# Patient Record
Sex: FEMALE | Race: WHITE | NOT HISPANIC OR LATINO | URBAN - METROPOLITAN AREA
[De-identification: names, ages, dates, MRNs, and addresses within clinical notes are randomized per-mention and may not be internally consistent; named-entity substitution may affect disease eponyms.]

---

## 2024-07-31 ENCOUNTER — LAB REQUISITION (OUTPATIENT)
Dept: LAB | Facility: HOSPITAL | Age: 24
End: 2024-07-31

## 2024-07-31 DIAGNOSIS — Z34.01 ENCOUNTER FOR SUPERVISION OF NORMAL FIRST PREGNANCY, FIRST TRIMESTER (HHS-HCC): ICD-10-CM

## 2024-07-31 PROCEDURE — 87591 N.GONORRHOEAE DNA AMP PROB: CPT

## 2024-07-31 PROCEDURE — 87491 CHLMYD TRACH DNA AMP PROBE: CPT

## 2024-08-01 LAB
C TRACH RRNA SPEC QL NAA+PROBE: NEGATIVE
N GONORRHOEA DNA SPEC QL PROBE+SIG AMP: NEGATIVE

## 2024-08-29 ENCOUNTER — LAB (OUTPATIENT)
Dept: LAB | Facility: LAB | Age: 24
End: 2024-08-29
Payer: COMMERCIAL

## 2024-08-29 DIAGNOSIS — Z34.01 ENCOUNTER FOR SUPERVISION OF NORMAL FIRST PREGNANCY, FIRST TRIMESTER (HHS-HCC): Primary | ICD-10-CM

## 2024-08-29 DIAGNOSIS — R51.9 HEADACHE, UNSPECIFIED: ICD-10-CM

## 2024-08-29 DIAGNOSIS — R03.0 ELEVATED BLOOD-PRESSURE READING, WITHOUT DIAGNOSIS OF HYPERTENSION: ICD-10-CM

## 2024-08-29 LAB
ABO GROUP (TYPE) IN BLOOD: NORMAL
ANTIBODY SCREEN: NORMAL
CREAT UR-MCNC: 49.4 MG/DL (ref 20–320)
ERYTHROCYTE [DISTWIDTH] IN BLOOD BY AUTOMATED COUNT: 13.7 % (ref 11.5–14.5)
HCT VFR BLD AUTO: 36 % (ref 36–46)
HGB BLD-MCNC: 11.9 G/DL (ref 12–16)
MCH RBC QN AUTO: 28.3 PG (ref 26–34)
MCHC RBC AUTO-ENTMCNC: 33.1 G/DL (ref 32–36)
MCV RBC AUTO: 86 FL (ref 80–100)
NRBC BLD-RTO: 0 /100 WBCS (ref 0–0)
PLATELET # BLD AUTO: 212 X10*3/UL (ref 150–450)
PROT UR-ACNC: 4 MG/DL (ref 5–24)
PROT/CREAT UR: 0.08 MG/MG CREAT (ref 0–0.17)
RBC # BLD AUTO: 4.21 X10*6/UL (ref 4–5.2)
RH FACTOR (ANTIGEN D): NORMAL
WBC # BLD AUTO: 7.1 X10*3/UL (ref 4.4–11.3)

## 2024-08-29 PROCEDURE — 87389 HIV-1 AG W/HIV-1&-2 AB AG IA: CPT

## 2024-08-29 PROCEDURE — 82565 ASSAY OF CREATININE: CPT

## 2024-08-29 PROCEDURE — 86803 HEPATITIS C AB TEST: CPT

## 2024-08-29 PROCEDURE — 83615 LACTATE (LD) (LDH) ENZYME: CPT

## 2024-08-29 PROCEDURE — 85027 COMPLETE CBC AUTOMATED: CPT

## 2024-08-29 PROCEDURE — 86850 RBC ANTIBODY SCREEN: CPT

## 2024-08-29 PROCEDURE — 87086 URINE CULTURE/COLONY COUNT: CPT

## 2024-08-29 PROCEDURE — 87340 HEPATITIS B SURFACE AG IA: CPT

## 2024-08-29 PROCEDURE — 86901 BLOOD TYPING SEROLOGIC RH(D): CPT

## 2024-08-29 PROCEDURE — 83036 HEMOGLOBIN GLYCOSYLATED A1C: CPT

## 2024-08-29 PROCEDURE — 86780 TREPONEMA PALLIDUM: CPT

## 2024-08-29 PROCEDURE — 86317 IMMUNOASSAY INFECTIOUS AGENT: CPT

## 2024-08-29 PROCEDURE — 36415 COLL VENOUS BLD VENIPUNCTURE: CPT

## 2024-08-29 PROCEDURE — 86900 BLOOD TYPING SEROLOGIC ABO: CPT

## 2024-08-29 PROCEDURE — 84156 ASSAY OF PROTEIN URINE: CPT

## 2024-08-29 PROCEDURE — 84460 ALANINE AMINO (ALT) (SGPT): CPT

## 2024-08-29 PROCEDURE — 82570 ASSAY OF URINE CREATININE: CPT

## 2024-08-29 PROCEDURE — 84520 ASSAY OF UREA NITROGEN: CPT

## 2024-08-29 PROCEDURE — 84450 TRANSFERASE (AST) (SGOT): CPT

## 2024-08-29 PROCEDURE — 84550 ASSAY OF BLOOD/URIC ACID: CPT

## 2024-08-30 LAB
ALT SERPL W P-5'-P-CCNC: 10 U/L (ref 7–45)
AST SERPL W P-5'-P-CCNC: 14 U/L (ref 9–39)
BUN SERPL-MCNC: 9 MG/DL (ref 6–23)
CREAT SERPL-MCNC: 0.65 MG/DL (ref 0.5–1.05)
EGFRCR SERPLBLD CKD-EPI 2021: >90 ML/MIN/1.73M*2
EST. AVERAGE GLUCOSE BLD GHB EST-MCNC: 82 MG/DL
HBA1C MFR BLD: 4.5 %
HBV SURFACE AG SERPL QL IA: NONREACTIVE
HCV AB SER QL: NONREACTIVE
HIV 1+2 AB+HIV1 P24 AG SERPL QL IA: NONREACTIVE
LDH SERPL L TO P-CCNC: 120 U/L (ref 84–246)
RUBV IGG SERPL IA-ACNC: 2.6 IA
RUBV IGG SERPL QL IA: POSITIVE
TREPONEMA PALLIDUM IGG+IGM AB [PRESENCE] IN SERUM OR PLASMA BY IMMUNOASSAY: NONREACTIVE
URATE SERPL-MCNC: 5.1 MG/DL (ref 2.3–6.7)

## 2024-08-31 LAB — BACTERIA UR CULT: NORMAL

## 2024-09-05 LAB
COMMENTS - MP RESULT TYPE: NORMAL
SCAN RESULT: NORMAL

## 2024-11-06 DIAGNOSIS — Z34.02 ENCOUNTER FOR SUPERVISION OF NORMAL FIRST PREGNANCY, SECOND TRIMESTER: Primary | ICD-10-CM

## 2024-11-13 ENCOUNTER — HOSPITAL ENCOUNTER (OUTPATIENT)
Dept: RADIOLOGY | Facility: CLINIC | Age: 24
Discharge: HOME | End: 2024-11-13
Payer: COMMERCIAL

## 2024-11-13 DIAGNOSIS — Z36.89 SCREENING, ANTENATAL, FOR FETAL ANATOMIC SURVEY (HHS-HCC): ICD-10-CM

## 2024-11-13 PROCEDURE — 76811 OB US DETAILED SNGL FETUS: CPT

## 2024-11-13 PROCEDURE — 76820 UMBILICAL ARTERY ECHO: CPT | Performed by: OBSTETRICS & GYNECOLOGY

## 2024-11-13 PROCEDURE — 76811 OB US DETAILED SNGL FETUS: CPT | Performed by: OBSTETRICS & GYNECOLOGY

## 2024-11-14 ENCOUNTER — TELEPHONE (OUTPATIENT)
Dept: OBSTETRICS AND GYNECOLOGY | Facility: HOSPITAL | Age: 24
End: 2024-11-14
Payer: COMMERCIAL

## 2024-11-21 ENCOUNTER — HOSPITAL ENCOUNTER (OUTPATIENT)
Dept: RADIOLOGY | Facility: HOSPITAL | Age: 24
Discharge: HOME | End: 2024-11-21
Payer: COMMERCIAL

## 2024-11-21 ENCOUNTER — CLINICAL SUPPORT (OUTPATIENT)
Dept: GENETICS | Facility: HOSPITAL | Age: 24
End: 2024-11-21
Payer: COMMERCIAL

## 2024-11-21 ENCOUNTER — INITIAL PRENATAL (OUTPATIENT)
Dept: MATERNAL FETAL MEDICINE | Facility: HOSPITAL | Age: 24
End: 2024-11-21
Payer: COMMERCIAL

## 2024-11-21 VITALS — WEIGHT: 231 LBS | SYSTOLIC BLOOD PRESSURE: 143 MMHG | DIASTOLIC BLOOD PRESSURE: 85 MMHG

## 2024-11-21 VITALS — WEIGHT: 231 LBS | DIASTOLIC BLOOD PRESSURE: 85 MMHG | SYSTOLIC BLOOD PRESSURE: 143 MMHG

## 2024-11-21 DIAGNOSIS — O28.3 ABNORMAL FETAL ULTRASOUND: Primary | ICD-10-CM

## 2024-11-21 DIAGNOSIS — Z36.89 SCREENING, ANTENATAL, FOR FETAL ANATOMIC SURVEY (HHS-HCC): ICD-10-CM

## 2024-11-21 DIAGNOSIS — Z3A.25 25 WEEKS GESTATION OF PREGNANCY (HHS-HCC): ICD-10-CM

## 2024-11-21 DIAGNOSIS — O28.3 ABNORMAL FETAL ULTRASOUND: ICD-10-CM

## 2024-11-21 PROCEDURE — 96040 PR MEDICAL GENETICS COUNSELING EACH 30 MINUTES: CPT | Performed by: GENETIC COUNSELOR, MS

## 2024-11-21 PROCEDURE — 99205 OFFICE O/P NEW HI 60 MIN: CPT | Performed by: OBSTETRICS & GYNECOLOGY

## 2024-11-21 PROCEDURE — 96040 HC GENETIC COUNSELING, EACH 30 MIN: CPT | Performed by: GENETIC COUNSELOR, MS

## 2024-11-21 PROCEDURE — 76816 OB US FOLLOW-UP PER FETUS: CPT

## 2024-11-21 PROCEDURE — 99215 OFFICE O/P EST HI 40 MIN: CPT | Performed by: OBSTETRICS & GYNECOLOGY

## 2024-11-21 ASSESSMENT — PAIN SCALES - GENERAL: PAINLEVEL_OUTOF10: 0-NO PAIN

## 2024-11-21 NOTE — PROGRESS NOTES
Fetal Care Clinic Consultation:  Referring Provider: Dr. Harmon  Date of Service: 24    Summary:   Patient is an 24 y.o.  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    Today's ultrasound findings:   - The CSP measures at the upper limits of normal at 9.6 cm. Septae Lamimae were see and the anterior horns to appear separate from the CSP. The preston mater appears  intact. Placement of the anterior horns is normal suggesting the corpus callosum(CC) is present. The corpus callosum is seen and the PCA and cingulate gyrus seen over  the CC. The CC does look thinned but is seen throughout it's length. Cortical development appears normal for the GA. The posterior fossa appears normal.  -No new issues were identified on this comprehensive survey within limitations of sonographic evaluation at this gestational age. The placenta and adnexa appear within  normal today.  -Today's anatomic survey was completed (distal spine views)  The parents were informed of the above and all questions addressed (see EPIC note). They would like an MRI to evaluate the CNS anatomy in detail. She will follow up in 3  weeks for growth.    History reviewed and patient has the following risk factors:   Unremarkable medical, surgical history    Labs/ Prior Imaging reviewed:   US on  at 24 wga:  -The CSP is not clearly present. The frontal horns appear fused across the midline. The corpus callosum appears present, there is no ventriculomegaly and the remainder of  the neuroanatomy appears normal. The midface appears normal.    Recommendations:   - MRI to be scheduled in 2-3 weeks with US follow up for repeat growth and further evaluation at that time.    Patient counselin) the cavum of septum pellucidum is a fluid-filled midline structure usually easily seen by the 20th week of gestation.  Visualized in the CSP is used as  a proxy to suggest other midline brain structures such as the corpus callosum the optic nerves and the pituitary gland have developed normally as these structures develop together.  The optic nerves and the pituitary gland cannot be seen in utero.  2) the CSP is part of the limbic system connecting to the hypothalamus hippocampus and the amygdala.  The CSP eventually goes away.  It is gone by 4-6 months of age and 85% with normally developing infants.  The limbic system is part of the brain involved in behavioral and emotional responses involving things we need for survival such as regulation of thirst hunger mood and emotional regulation. This is a more active area of the brain in lower order mammals. Absent CSP can also be associated with other forebrain abnormalities including pituitary disorders and septo-optic dysplasia.  The patient was informed that septo-optic dysplasia is associated with variable degrees of visual impairment.  3) MRI would be a better imaging modality for the fetal brain.  Additional information is more often added if the MRI is done later in pregnancy.  After birth,  MRI would yield the best information.  MRI findings are unlikely to change obstetric care for pediatric management. We specifically discussed that MRI would allow us to better visualize the CSP as well as all other brain anatomy. The MRI will be able to better characterize the optic nerves and pituitary regions. 10% of the time, MRI finds has additional pertinent findings. The patient is willing to proceed with MRI to obtain more information.  4) Dependent on the results of the MRI, there may be an increased risk for other genetic problems such as single gene disorders.  This can be discussed after MRI results are obtained. She currently had a rr cfDNA and declines further screening and testing.  We discussed that we are optimistic given the ability to see CSP, although enlarged.  Seen and discussed with   Dustin Choi MD  Fellow, Maternal Fetal Medicine     I saw and evaluated the patient. I personally obtained the key and critical portions of the history and physical exam or was physically present for key and critical portions performed by the fellow. I reviewed the fellow's documentation and discussed the patient with the fellow. I agree with the fellow's medical decision making as documented in the note.    Shaista Chan M.D.  [facility]  Division of Maternal Fetal Medicine  Clinical   Dept. of Reproductive Biology, [facility]  Office phone- [phone]  Nurse coordinator Smiley Hendricks- [phone]

## 2024-11-21 NOTE — PROGRESS NOTES
Thank you for the referral of Ms. Ena Lott. she is a 24 y.o.,   female who was 25w0d weeks pregnant at the time of our appointment with an EDC of 3/6/25.  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    PAST HISTORY:   History related to referral    Ultrasounds in the current pregnancy:   US on 24  Assigned GA24 w + 1 d  Assigned ARMANDO:3/4/2025  Fetal Growth Overview  =================  Exam date        GA              BPD (mm)          HC (mm)               AC (mm)            FL (mm)         HL (mm)           EFW (g)  2024        24w 1d        51.6     <1%        199.5    <1%        87.7     <1%        40    8%        36.2     2%        280    <1%  Impression  =========  A targeted anatomic survey was indicated for absent CSP fused anterior horns.  -Detailed anatomic evaluation of the fetal brain/ventricles, face, heart/outflow tracts and chest anatomy, abdominal organ specific anatomy, number/length/architecture of  limbs and detailed evaluation of the umbilical cord and placenta and other fetal anatomy as clinically indicated was performed.  -The CSP is not clearly present. The frontal horns appear fused across the midline. The corpus callosum appears present, there is no ventriculomegaly and the remainder of  the neuroanatomy appears normal. The midface appears normal.  -Fetal biometry is measuring symmetrically 4 weeks behind the expected ARMANDO with the EFW <1%ile. Wile long bones are measuring globally short, z-scores are in the 1-2  range without evidence of fracturing, bowing and the femur:foot ratio is 1.013 and so this likely representative of overall decreased growth rather than a skeletal dysplasia  - UA dopplers are normal.  -No other malformations were identified on this comprehensive survey within limitations of sonographic evaluation at this gestational age. The placenta and  adnexa appear  within normal today.  -Today's anatomic survey was completed except views of the sagittal L-spine and S-spine     I reviewed the findings today at length. The patient reports low risk cfDNA results for common aneuploidies and sex chromosomes (not available for review). I reviewed the differential including septo-optic dysplasia and lobar holoprosencephaly. I reviewed that often septo-optic dysplasia occurs sporadically in the absence of underling genetic condition, thought the concurrence of early onset FGR raises the suspicion of a genetic diagnosis. I briefly reviewed the implications of these diagnoses, but that additional evaluation is recommended in order to precisely characterize the findings and provide prognostic information.    Follow up ultrasound was performed in Fetal Care Clinic today as follows:    Exam date        GA              BPD (mm)          HC (mm)               AC (mm)              FL (mm)             HL (mm)           EFW (g)  11/13/2024        24w 1d        51.6     <1%        199.5    <1%        87.7     <1%         40         8%        36.2     2%        280    <1%  11/21/2024        25w 0d        56.1     2%          216.8    3%          196.9     22%        43.3    15%        38     2%           668    13%  Impression  =========     BMI 36. The patient was sent for and absent CSP and FGR. However, dating was a bit in error. The patient only had sex once on 6/13/24 with a + pregnancy test 2 week  later. This dating was confirmed by the 8 week scan noted above. Thus the ARMANDO was reassigned to 3/6/25. On the previous evaluation, absent CSP was suspected.  A targeted anatomic survey was indicated for neurosonography  -Fetal biometry is consistent with the stated gestational age, in the low normal range by correction of the ARMANDO  -Detailed anatomic evaluation of the fetal brain/ventricles, face, heart/outflow tracts and chest anatomy, abdominal organ specific anatomy,  number/length/architecture of  limbs and detailed evaluation of the umbilical cord and placenta and other fetal anatomy as clinically indicated was performed.  - The CSP measures at the upper limits of normal at 9.6 cm. Septae Lamimae were see and the anterior horns to appear separate from the CSP. The preston mater appears  intact. Placement of the anterior horns is normal suggesting the corpus callosum(CC) is present. The corpus callosum is seen and the PCA and cingulate gyrus seen over  the CC. The CC does look thinned but is seen throughout it's length. Cortical development appears normal for the GA. The posterior fossa appears normal.  -No new issues were identified on this comprehensive survey within limitations of sonographic evaluation at this gestational age. The placenta and adnexa appear within  normal today.  -Today's anatomic survey was completed (distal spine views)  The parents were informed of the above and all questions addressed (see EPIC note). They would like an MRI to evaluate the CNS anatomy in detail. She will follow up in 3  weeks for growth      Prior aneuploidy screening:        Prior carrier screening: none    Patient otherwise denies complications such as bleeding or cramping, exposures, infection/illness, and travel outside of the [place] since finding out she was pregnant.    FAMILY HISTORY  Medical and family histories were reviewed and no undue concerns regarding this pregnancy were apparent:  Patient:   -Mother with breast cancer, negative genetic testing   -sister with hole in heart, no surgery    Partner:   -Mother and sister with possible ovarian cancer, though unknown   -maternal half brother has a daughter with autism    The remainder of the family history was negative for birth defects, intellectual disability, recurrent pregnancy loss, or recognized inherited conditions.  Consanguinity denied.    REPORTED ETHNICITY/RACE:  Patient: [place]  Patient's partner:  (Soujustice) &  [place]    COUNSELING:    The following information was discussed with your patient:    Chromosomes, genes, sporadic conditions, and inheritance patterns were discussed today. Some of the common chromosome conditions associated with the above findings include Trisomy 13, Trisomy 18, and Trisomy 21, and features of these conditions were reviewed today. We also reviewed more rare chromosome changes, such as microdeletions and microduplications. There could additionally be a single gene condition that connects the above findings. Finally, it is possible that there is no identifiable genetic cause.   We discussed that the US findings today are different from those that lead the patient to be seen in [facility]. We reviewed how it is possible to get different images and therefore have a different and possibly better understanding of the brain structures seen today. We discussed that the CSP is a fluid filled space inside the brain that is seen only in developing brains and not seen in fully developed brains. We reviewed that an abnormal measurement of this brain structure is above 10mm and the measurement today on their baby was 9.6mm. This is considered a normal brain (CSP measurement WNL).   We further discussed children with dilated CSPs have been found 12% of the time to have learning delay. We discussed there is a range of learning delay and ultimately no imaging study will be able to predict the learning capabilities of a child. The larger percentage of children with a dilated CSP were found NOT to have any neurodevelopmental/learning disability. Again, it was reiterated that the current US does not even show a dilated CSP per the measurement definitions. Further, of the children found to have a dilated CSP, there was no genetic cause identified.   We did review the options for further genetic screenings/testing. We discussed the option of non invasive screening as well as the invasive testing with amniocentesis which  would allow the most comprehensive genetic testing to be performed on this pregnancy. We discussed the utility of comprehensive genetic testing is likely much lower given the US findings today as compared to other possible US findings.   Finally we also discussed the option of obtaining more detailed information about the brain structures of the pregnancy with an MRI. The [facility] team is planning an MRI and the family is interested in this. We reviewed that after obtaining the MRI, this could give us more information and help to better assess the probability of genetic association. We discussed genetic testing can be offered at any time and can even occur after birth.     DISPOSITION:  The patient stated that she understood the above information and elected to proceed with MRI without genetic screening at this time. Time spent in consultation 48 minutes.       Thank you for allowing us to participate in the care of your patient.  Should you or your patient have any questions, please do not hesitate to contact our office at [phone].    Sincerely,  Maria Dolores Woodson MD, PGY 5  Maternal Fetal Medicine     Clover Carr MS, [facility]

## 2024-11-27 ENCOUNTER — TELEPHONE (OUTPATIENT)
Dept: OBSTETRICS AND GYNECOLOGY | Facility: HOSPITAL | Age: 24
End: 2024-11-27
Payer: COMMERCIAL

## 2024-12-04 ENCOUNTER — TELEPHONE (OUTPATIENT)
Dept: OBSTETRICS AND GYNECOLOGY | Facility: HOSPITAL | Age: 24
End: 2024-12-04
Payer: COMMERCIAL

## 2024-12-04 DIAGNOSIS — Q07.9: Primary | ICD-10-CM

## 2024-12-09 ENCOUNTER — LAB (OUTPATIENT)
Dept: LAB | Facility: LAB | Age: 24
End: 2024-12-09
Payer: COMMERCIAL

## 2024-12-09 DIAGNOSIS — Z34.02 ENCOUNTER FOR SUPERVISION OF NORMAL FIRST PREGNANCY, SECOND TRIMESTER: ICD-10-CM

## 2024-12-09 LAB
ERYTHROCYTE [DISTWIDTH] IN BLOOD BY AUTOMATED COUNT: 14.6 % (ref 11.5–14.5)
GLUCOSE 1H P 50 G GLC PO SERPL-MCNC: 124 MG/DL
HCT VFR BLD AUTO: 35.6 % (ref 36–46)
HGB BLD-MCNC: 11.2 G/DL (ref 12–16)
MCH RBC QN AUTO: 27.9 PG (ref 26–34)
MCHC RBC AUTO-ENTMCNC: 31.5 G/DL (ref 32–36)
MCV RBC AUTO: 89 FL (ref 80–100)
NRBC BLD-RTO: 0 /100 WBCS (ref 0–0)
PLATELET # BLD AUTO: 168 X10*3/UL (ref 150–450)
RBC # BLD AUTO: 4.02 X10*6/UL (ref 4–5.2)
WBC # BLD AUTO: 6.1 X10*3/UL (ref 4.4–11.3)

## 2024-12-09 PROCEDURE — 36415 COLL VENOUS BLD VENIPUNCTURE: CPT

## 2024-12-09 PROCEDURE — 85027 COMPLETE CBC AUTOMATED: CPT

## 2024-12-09 PROCEDURE — 82947 ASSAY GLUCOSE BLOOD QUANT: CPT

## 2024-12-10 LAB — REFLEX ADDED, ANEMIA PANEL: NORMAL

## 2024-12-17 ENCOUNTER — DOCUMENTATION (OUTPATIENT)
Dept: RADIOLOGY | Facility: CLINIC | Age: 24
End: 2024-12-17
Payer: COMMERCIAL

## 2024-12-17 ENCOUNTER — HOSPITAL ENCOUNTER (OUTPATIENT)
Dept: RADIOLOGY | Facility: HOSPITAL | Age: 24
Discharge: HOME | End: 2024-12-17
Payer: COMMERCIAL

## 2024-12-17 DIAGNOSIS — Q07.9: ICD-10-CM

## 2024-12-17 DIAGNOSIS — O35.9XX0 KNOWN FETAL ANOMALY, ANTEPARTUM, SINGLE OR UNSPECIFIED FETUS (HHS-HCC): ICD-10-CM

## 2024-12-17 PROCEDURE — 76819 FETAL BIOPHYS PROFIL W/O NST: CPT

## 2024-12-17 PROCEDURE — 76816 OB US FOLLOW-UP PER FETUS: CPT | Performed by: OBSTETRICS & GYNECOLOGY

## 2024-12-17 PROCEDURE — 76820 UMBILICAL ARTERY ECHO: CPT | Performed by: OBSTETRICS & GYNECOLOGY

## 2024-12-17 PROCEDURE — 76816 OB US FOLLOW-UP PER FETUS: CPT

## 2024-12-17 PROCEDURE — 76819 FETAL BIOPHYS PROFIL W/O NST: CPT | Performed by: OBSTETRICS & GYNECOLOGY

## 2024-12-17 PROCEDURE — 74712 MRI FETAL SNGL/1ST GESTATION: CPT

## 2024-12-17 NOTE — PROGRESS NOTES
Telephone note:  The patient was informed of the MRI.  The CSP is slightly enlarged but all other midline structures appear normal.  Ventricles are normal. Cortical development is normal.  There is no suggestion of a CSP cyst.  Plan is head ultrasound after delivery.  She will t-lauri to the [facility] for care.  FGR is seen on today's evaluation.  Weekly scans scheduled.  Shaista Chan M.D.  [facility]  Division of Maternal Fetal Medicine  Clinical   Dept. of Reproductive Biology, [facility]  Office phone- [phone]  Nurse coordinator Smiley Hendricks- [phone]

## 2024-12-26 ENCOUNTER — HOSPITAL ENCOUNTER (OUTPATIENT)
Dept: RADIOLOGY | Facility: HOSPITAL | Age: 24
Discharge: HOME | End: 2024-12-26
Payer: COMMERCIAL

## 2024-12-26 DIAGNOSIS — Z36.89 SCREENING, ANTENATAL, FOR FETAL ANATOMIC SURVEY (HHS-HCC): ICD-10-CM

## 2024-12-26 PROCEDURE — 76820 UMBILICAL ARTERY ECHO: CPT

## 2024-12-26 PROCEDURE — 76820 UMBILICAL ARTERY ECHO: CPT | Performed by: OBSTETRICS & GYNECOLOGY

## 2024-12-26 PROCEDURE — 76815 OB US LIMITED FETUS(S): CPT

## 2024-12-26 PROCEDURE — 76819 FETAL BIOPHYS PROFIL W/O NST: CPT | Performed by: OBSTETRICS & GYNECOLOGY

## 2024-12-26 PROCEDURE — 76815 OB US LIMITED FETUS(S): CPT | Performed by: OBSTETRICS & GYNECOLOGY

## 2024-12-27 ENCOUNTER — TELEPHONE (OUTPATIENT)
Dept: RADIOLOGY | Facility: CLINIC | Age: 24
End: 2024-12-27
Payer: COMMERCIAL

## 2024-12-27 NOTE — TELEPHONE ENCOUNTER
This is [place] patient and she trying to find out what medication can she take she is 30wks pregnant she would like a nurse call back

## 2024-12-27 NOTE — TELEPHONE ENCOUNTER
Patient complains of a dry, heavy cough, on week 2.  She denies shortness of breath, fever.  Advised plain mucinex, humidity from show or humidifier, fluids, cough drops.  Saline nose drops if needed.  Patient will monitor and follow up 1/2/25.

## 2025-01-02 ENCOUNTER — CLINICAL SUPPORT (OUTPATIENT)
Dept: GENETICS | Facility: HOSPITAL | Age: 25
End: 2025-01-02
Payer: COMMERCIAL

## 2025-01-02 ENCOUNTER — HOSPITAL ENCOUNTER (OUTPATIENT)
Dept: RADIOLOGY | Facility: HOSPITAL | Age: 25
Discharge: HOME | End: 2025-01-02
Payer: COMMERCIAL

## 2025-01-02 ENCOUNTER — ROUTINE PRENATAL (OUTPATIENT)
Dept: MATERNAL FETAL MEDICINE | Facility: HOSPITAL | Age: 25
End: 2025-01-02
Payer: COMMERCIAL

## 2025-01-02 VITALS — SYSTOLIC BLOOD PRESSURE: 143 MMHG | DIASTOLIC BLOOD PRESSURE: 86 MMHG | WEIGHT: 233 LBS

## 2025-01-02 VITALS — WEIGHT: 233 LBS | DIASTOLIC BLOOD PRESSURE: 89 MMHG | SYSTOLIC BLOOD PRESSURE: 134 MMHG

## 2025-01-02 DIAGNOSIS — O13.3 GESTATIONAL HYPERTENSION, THIRD TRIMESTER (HHS-HCC): ICD-10-CM

## 2025-01-02 DIAGNOSIS — O36.5990 IUGR (INTRAUTERINE GROWTH RESTRICTION) AFFECTING CARE OF MOTHER, UNSPECIFIED TRIMESTER, NOT APPLICABLE OR UNSPECIFIED FETUS (HHS-HCC): ICD-10-CM

## 2025-01-02 DIAGNOSIS — Z36.89 SCREENING, ANTENATAL, FOR FETAL ANATOMIC SURVEY (HHS-HCC): ICD-10-CM

## 2025-01-02 DIAGNOSIS — O35.00X0 FETAL CNS MALFORMATION IN PREGNANCY, SINGLE GESTATION (HHS-HCC): Primary | ICD-10-CM

## 2025-01-02 DIAGNOSIS — O36.5990 FETAL GROWTH RESTRICTION ANTEPARTUM (HHS-HCC): ICD-10-CM

## 2025-01-02 DIAGNOSIS — O99.210 OBESITY AFFECTING PREGNANCY (HHS-HCC): ICD-10-CM

## 2025-01-02 DIAGNOSIS — R93.89 ABNORMAL FINDING ON ULTRASOUND: ICD-10-CM

## 2025-01-02 DIAGNOSIS — Z3A.31 31 WEEKS GESTATION OF PREGNANCY (HHS-HCC): ICD-10-CM

## 2025-01-02 LAB
ALBUMIN SERPL BCP-MCNC: 4.1 G/DL (ref 3.4–5)
ALP SERPL-CCNC: 76 U/L (ref 33–110)
ALT SERPL W P-5'-P-CCNC: 8 U/L (ref 7–45)
ANION GAP SERPL CALC-SCNC: 14 MMOL/L (ref 10–20)
AST SERPL W P-5'-P-CCNC: 12 U/L (ref 9–39)
BILIRUB SERPL-MCNC: 0.3 MG/DL (ref 0–1.2)
BUN SERPL-MCNC: 9 MG/DL (ref 6–23)
CALCIUM SERPL-MCNC: 9.2 MG/DL (ref 8.6–10.6)
CHLORIDE SERPL-SCNC: 108 MMOL/L (ref 98–107)
CO2 SERPL-SCNC: 21 MMOL/L (ref 21–32)
CREAT SERPL-MCNC: 0.49 MG/DL (ref 0.5–1.05)
CREAT UR-MCNC: 70.9 MG/DL (ref 20–320)
EGFRCR SERPLBLD CKD-EPI 2021: >90 ML/MIN/1.73M*2
ERYTHROCYTE [DISTWIDTH] IN BLOOD BY AUTOMATED COUNT: 14.5 % (ref 11.5–14.5)
GLUCOSE SERPL-MCNC: 71 MG/DL (ref 74–99)
HCT VFR BLD AUTO: 36.4 % (ref 36–46)
HGB BLD-MCNC: 12 G/DL (ref 12–16)
MCH RBC QN AUTO: 28.2 PG (ref 26–34)
MCHC RBC AUTO-ENTMCNC: 33 G/DL (ref 32–36)
MCV RBC AUTO: 85 FL (ref 80–100)
NRBC BLD-RTO: 0 /100 WBCS (ref 0–0)
PLATELET # BLD AUTO: 201 X10*3/UL (ref 150–450)
POTASSIUM SERPL-SCNC: 3.9 MMOL/L (ref 3.5–5.3)
PROT SERPL-MCNC: 6.8 G/DL (ref 6.4–8.2)
PROT UR-ACNC: 11 MG/DL (ref 5–24)
PROT/CREAT UR: 0.16 MG/MG CREAT (ref 0–0.17)
RBC # BLD AUTO: 4.26 X10*6/UL (ref 4–5.2)
SODIUM SERPL-SCNC: 139 MMOL/L (ref 136–145)
WBC # BLD AUTO: 8.2 X10*3/UL (ref 4.4–11.3)

## 2025-01-02 PROCEDURE — 85027 COMPLETE CBC AUTOMATED: CPT | Performed by: GENETIC COUNSELOR, MS

## 2025-01-02 PROCEDURE — 80053 COMPREHEN METABOLIC PANEL: CPT | Performed by: GENETIC COUNSELOR, MS

## 2025-01-02 PROCEDURE — 99213 OFFICE O/P EST LOW 20 MIN: CPT | Performed by: OBSTETRICS & GYNECOLOGY

## 2025-01-02 PROCEDURE — 76815 OB US LIMITED FETUS(S): CPT

## 2025-01-02 PROCEDURE — 76820 UMBILICAL ARTERY ECHO: CPT

## 2025-01-02 PROCEDURE — 36415 COLL VENOUS BLD VENIPUNCTURE: CPT | Performed by: GENETIC COUNSELOR, MS

## 2025-01-02 PROCEDURE — 82570 ASSAY OF URINE CREATININE: CPT

## 2025-01-02 PROCEDURE — 90471 IMMUNIZATION ADMIN: CPT | Performed by: STUDENT IN AN ORGANIZED HEALTH CARE EDUCATION/TRAINING PROGRAM

## 2025-01-02 NOTE — PROGRESS NOTES
Carrier Kaylie is a 24 year old, , female who was 31 weeks pregnant at the time of our appointment with an EDC of 2025.  She was seen for genetic counseling with her partner due to fetus with enlarged CSP and growth restriction.        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 EFW was reported as <1%ile; however dating was reassigned on a follow up scan at 25 weeks gestation with biometry in the low normal range (EFW 13%ile). CSP was seen (noted to be borderline enlarged at 9.6mm). genetic counseling was performed at that time, and patient declined additional testing (previously had negative cfDNA screeing via [employer] screen for common aneuploidies and microdeletions. Follow up ultrasound and MRI performed at 28 5/7 weeks gestation at which time CSP measured 10.1mm; no other brain abnormalities noted, and EFW noted to be 8%ile.     FAMILY HISTORY  Medical and family histories were previously obtained as follows:   Patient:   -Mother with breast cancer, negative genetic testing   -sister with hole in heart, no surgery     Partner:   -Mother and sister with possible ovarian cancer, though unknown   -maternal half brother has a daughter with autism     The remainder of the family history was negative for birth defects, intellectual disability, recurrent pregnancy loss, or recognized inherited conditions.  Consanguinity denied.     REPORTED ETHNICITY/RACE:  Patient: [place]  Patient's partner:  (Soujustice) & ItalianD     COUNSELING:    The following information was discussed with your patient:    1. Enlargement of the cavum septum pellucidum has been associated with other congenital anomalies and is also reported to be associated with chromosome abnormalities (particularly trisomy 21) when seen in conjunction with other fetal abnormalities. When isolated, enlargement of  the CSP detected prenatally is of unclear significance, and most often is thought to be a normal, benign variant. It can be associated with dilated third ventricle, vein of Magan malformation, and interhemispheric cysts.  None of these findings have been noted on previous ultrasounds or MRI.  persistence/enlargement of the CSP has been associated with developmental delays and psychiatric disorders such as schizophrenia; however it is unclear whether prenatal enlargement has any association with  persistence/enlargement.     2. Fetal growth restriction (FGR) may be due to incorrect dating, placental abnormalities/insufficiency, drug and alcohol exposure, congenital infection (<5% of cases), or a genetic condition (5-20% of cases). Multiple genetic conditions may result in FGR including single gene conditions as well as chromosome abnormalities.     3. Additional risk factors for FGR include maternal factors such as age (< 16 years or > 35 years), race, lower socioeconomic status, low maternal weight at birth, low pre-pregnancy weight and poor weight gain (BMI less than 20), chronic maternal hypoxia, short or long interpregnancy interval (less than 6 months or 120 months or more), conception with assisted reproductive technologies, maternal medical conditions (chronic hypertension, preeclampsia, pre-gestational diabetes, chronic renal insufficiency, systemic lupus erythematosus (SLE), antiphospholipid syndrome (APS)), malnutrition and gastrointestinal conditions (Crohn's disease, ulcerative colitis, chronic pancreatitis, gastric bypass surgery), and hematologic and immunologic disorders.     4. We discussed that additional genetic non invasive cfDNA screening is available ([employer] screen, [facility] single gene screening) although yield of this testing is uncertain in this case. This testing is not comprehensive and not diagnostic, although can screen for some additional disorders that were not  previously screened for that may be associated with fetal growth restriction. Patient has already had negative cfDNA screening for Down syndrome and other common aneuploidies, and these results were again reviewed.      5. The most comprehensive prenatal genetic testing that would yield the most information is available via amniocentesis. The availability of diagnostic fetal chromosome analysis, single gene panel testing, as well as whole genome sequencing via amniocentesis. The methods, benefits, limitations and risks of amniocentesis and a 1 in 400 risk of complications, including a 1 in 800 risk of  delivery.  genetic testing is also available if concern for a genetic etiology is suspected after delivery.        DISPOSITION:  The patient stated that she understood the above information and declined to proceed with any additional genetic testing at this time. If additional concerns are raised on future ultrasounds she may reconsider testing.       Clover Carr MS, Licensed Genetic Counselor spent 28 minutes with the patient with greater than 50% of the time spent in face to face counseling.     Thank you for allowing us to participate in the care of your patient.  Should you or your patient have any questions, please do not hesitate to contact our office at [phone].      Sincerely,      Clover Carr MS  Licensed Genetic Counselor

## 2025-01-02 NOTE — PROGRESS NOTES
Fetal Care Clinic Follow Up  Date of Service: 25     Summary:   24 year old  at 31w0d presenting for [facility] follow up visit.  - Dilated CSP, no other intracranial abnormalities on ultrasound  - S/p fetal MRI, confirmed dilated CSP, no cyst or midline abnormalities, no additional intracranial abnormalities  - FGR diagnosed at 28 weeks, 8%, currently undergoing weekly  testing  - Following with [facility] for prenatal care    Patient reports she is feeling okay today. Denies vaginal bleeding, leaking fluid, or contractions. Feeling active fetal movement. Denies headache, changes in vision, or shortness of breath, denies changes in swelling or right upper quadrant or epigastric pain. Cough over the last several weeks worst at night and productive of clear sputum, occasional sick contacts but none currently. Symptoms consistent with likely postnasal drip, has not tried medications.    Today's ultrasound findings:   Unusually wide CSP on ultrasound confirmed with MRI and MRI showing no midline abnormalities or cyst.  FGR noted on the previous evaluation. BMI 36, rr cfDNA, normal diabetic screening. She presents for BPP/doppler evaluation. Mildly elevated BP on today's evaluation and the previous one.     - Breech lie  - normal AFV  - BPP /  - CSP again 11mm which is stable but just above the normal range    PEC labs to be sent, 2 x week  testing scheduled,  head ultrasound planned.  PNC here and [place] [facility] [place].  Thank you for allowing us to participate in the care of your patient    History reviewed and patient has the following risk factors:   Elevated blood pressure  - Patient has had mild range BP at initial [facility] consultation at 25 weeks, no elevated BP prior to this, review of baseline all with normotensive values  - Denies preeclampsia symptoms today  - Known FGR, Dopplers not suggestive of placental insufficiency      Labs/ Prior Imaging reviewed:   24 wga:  -The CSP is not  clearly present. The frontal horns appear fused across the midline. The corpus callosum appears present, there is no ventriculomegaly and the remainder of  the neuroanatomy appears normal. The midface appears normal.    25 wga:  - Dating error corrected to ARMANDO 3/6/25  - A targeted anatomic survey was indicated for neurosonography  - Fetal biometry is consistent with the stated gestational age, in the low normal range by correction of the ARMANDO  - Detailed anatomic evaluation of the fetal brain/ventricles, face, heart/outflow tracts and chest anatomy, abdominal organ specific anatomy, number/length/architecture of  limbs and detailed evaluation of the umbilical cord and placenta and other fetal anatomy as clinically indicated was performed.  - The CSP measures at the upper limits of normal at 9.6 cm. Septae Lamimae were see and the anterior horns to appear separate from the CSP. The preston mater appears  intact. Placement of the anterior horns is normal suggesting the corpus callosum(CC) is present. The corpus callosum is seen and the PCA and cingulate gyrus seen over  the CC. The CC does look thinned but is seen throughout it's length. Cortical development appears normal for the GA. The posterior fossa appears normal.  - No new issues were identified on this comprehensive survey within limitations of sonographic evaluation at this gestational age. The placenta and adnexa appear within  normal today.  - Today's anatomic survey was completed (distal spine views)    28 wga:  Fetal MRI  IMPRESSION:  1. The study was limited due to severe fetal motion artifacts.  2. The cavum septum pellucidum measures up to 1.1 cm, which is in the  upper limits/mildly enlarged although both septal leaflets are  visualized. No definite/detectable focal lesion is seen in this  limited evaluation. However, the possibility of tiny CSP cysts can  not be completely excluded and  head ultrasound is advised  and may provide more  information.  3. With the limitations, the remaining of the fetal anatomy is  apparently within normal limits, as detailed above.  Follow Up Ultrasound  - Decreased interval fetal growth. The Ac is at the 31%, and the EFW is at the 8% percentile. Biometry is symmetrically low  - Normal doppler indices with an RI at the 50% percentile  - BPP 8/8, normal amniotic fluid volume  - The area of the CSP was again evaluated today. Measurements are now above upper limit of normal at 1.1cm. What is felt to be the septae laminae are again suspected  to be spaced due to this dilation but otherwise are normal in appearance. It is possible there is a CSP cyst which may mimic the septae laminae. The third and fourth  ventricles are normal in appearance. The anterior horns of the lateral ventricles articulate with the area of the CSP in the expected configuration. There are no other  intracranial anomalies observed today, though imaging is limited by fetal positioning.  - Transvaginal evaluation was not performed given patient will be undergoing fetal MRI  this afternoon. There is normal cortical development for the GA.  - No new issues were identified on this comprehensive survey within limitations of sonographic evaluation at this gestational age. The placenta and adnexa appear within  normal today.    30 wga:  Unusually wide CSP on ultrasound confirmed with MRI and MRI showing no midline abnormalities or cyst. FGR noted on the previous evaluation. BMI 36, rr cfDNA, normal  diabetic screening. She presents for BPP/doppler evaluation.  - Normal amniotic fluid volume  - BPP 8/8  - Normal Doppler indices    Objective:  Vitals:    01/02/25 1219 01/02/25 1436   BP: 143/86 134/89   Weight: 106 kg (233 lb)      Physical Exam:  Gen - no acute distress  Resp - non labored breathing  Cardio - warm and well perfused  Extrem - symmetric, non erythematous  Abd - gravid, non tender  Neuro - alert and oriented, no gross defects    Assessment  and Plan:  24 year old  at 31w0d presenting for [facility] follow up visit, pregnancy complicated by fetal dilated CSP and FGR diagnosed at 28 weeks.    Dilated CSP  - No other intracranial abnormalities identified on MRI, stable measurements today  - Reviewed expected excellent prognosis for this condition in isolation  - Plan for head ultrasound and  evaluation after delivery    FGR  - Weekly BPP and Dopplers ongoing, no findings today suggestive of placental insufficiency    Gestational Hypertension  - New diagnosis today given mild range blood pressures >4 hours apart (separate days) >20 weeks gestation in absence of evidence or diagnosis of chronic hypertension prior to pregnancy  - Normal serum baseline labs and negative baseline proteinuria evalaution  - We discussed the diagnosis of gestational hypertension. Up to 50% of women with gestational hypertension will eventually develop proteinuria or other end-organ dysfunction consistent with the diagnosis of preeclampsia, and this progression is more likely when the hypertension is diagnosed before 32 weeks of gestation  - Symptoms of preeclampsia were discussed today  - Has not been checking home BP but does have cuff, recommended BID BP monitoring, reviewed goal values and BP values for which to present for evaluation  - Plan for twice weekly testing (NST alternating with BPP), to be scheduled  - Weekly prenatal care visits and preeclampsia labs, labs sent today  - Delivery at 37w0d, sooner for other maternal or fetal indications    Prenatal Care  - Following with [facility] for care  - Tdap given today, otherwise up to date  - RSV next visit  - Rh positive  - Postpartum birth control to be discussed next visit    Return visit 1 week for BPP and prenatal visit/BP check    Patient seen and counseled with Dr. Dustin Park MD  [facility] Fellow  I saw and evaluated the patient. I personally obtained the key and critical portions of the history and physical  exam or was physically present for key and critical portions performed by the fellow. I reviewed the fellow's documentation and discussed the patient with the fellow. I agree with the fellow's medical decision making as documented in the note.    Shaista Chan M.D.  [facility]  Division of Maternal Fetal Medicine  Clinical   Dept. of Reproductive Biology, [facility]  Office phone- [phone]  Nurse coordinator Smiley Hendricks- [phone]

## 2025-01-03 ENCOUNTER — TELEPHONE (OUTPATIENT)
Dept: OBSTETRICS AND GYNECOLOGY | Facility: HOSPITAL | Age: 25
End: 2025-01-03
Payer: COMMERCIAL

## 2025-01-03 PROBLEM — O99.210 OBESITY AFFECTING PREGNANCY (HHS-HCC): Status: ACTIVE | Noted: 2025-01-03

## 2025-01-03 PROBLEM — O35.00X0: Status: ACTIVE | Noted: 2025-01-03

## 2025-01-03 PROBLEM — O13.3 GESTATIONAL HYPERTENSION, THIRD TRIMESTER (HHS-HCC): Status: ACTIVE | Noted: 2025-01-03

## 2025-01-03 NOTE — TELEPHONE ENCOUNTER
"RN returned patients call  No answer at this time  Voicemail left encouraging patient to call the office with questions  RN will send \"medications safe in pregnancy\" document to patient via [employer].     Latricia MAURO, RN    "

## 2025-01-06 ENCOUNTER — HOSPITAL ENCOUNTER (OUTPATIENT)
Dept: RADIOLOGY | Facility: HOSPITAL | Age: 25
Discharge: HOME | End: 2025-01-06
Payer: COMMERCIAL

## 2025-01-06 DIAGNOSIS — Z36.89 SCREENING, ANTENATAL, FOR FETAL ANATOMIC SURVEY (HHS-HCC): ICD-10-CM

## 2025-01-06 PROCEDURE — 76816 OB US FOLLOW-UP PER FETUS: CPT

## 2025-01-06 PROCEDURE — 76820 UMBILICAL ARTERY ECHO: CPT

## 2025-01-06 PROCEDURE — 76819 FETAL BIOPHYS PROFIL W/O NST: CPT | Performed by: OBSTETRICS & GYNECOLOGY

## 2025-01-06 PROCEDURE — 76816 OB US FOLLOW-UP PER FETUS: CPT | Performed by: OBSTETRICS & GYNECOLOGY

## 2025-01-06 PROCEDURE — 76820 UMBILICAL ARTERY ECHO: CPT | Performed by: OBSTETRICS & GYNECOLOGY

## 2025-01-07 ENCOUNTER — APPOINTMENT (OUTPATIENT)
Dept: OBSTETRICS AND GYNECOLOGY | Facility: HOSPITAL | Age: 25
End: 2025-01-07
Payer: COMMERCIAL

## 2025-01-09 ENCOUNTER — APPOINTMENT (OUTPATIENT)
Dept: RADIOLOGY | Facility: HOSPITAL | Age: 25
End: 2025-01-09
Payer: COMMERCIAL

## 2025-01-09 ENCOUNTER — APPOINTMENT (OUTPATIENT)
Dept: OBSTETRICS AND GYNECOLOGY | Facility: HOSPITAL | Age: 25
End: 2025-01-09
Payer: COMMERCIAL

## 2025-01-09 ENCOUNTER — ROUTINE PRENATAL (OUTPATIENT)
Dept: MATERNAL FETAL MEDICINE | Facility: HOSPITAL | Age: 25
End: 2025-01-09
Payer: COMMERCIAL

## 2025-01-09 ENCOUNTER — CLINICAL SUPPORT (OUTPATIENT)
Dept: OBSTETRICS AND GYNECOLOGY | Facility: HOSPITAL | Age: 25
End: 2025-01-09
Payer: COMMERCIAL

## 2025-01-09 VITALS — DIASTOLIC BLOOD PRESSURE: 85 MMHG | WEIGHT: 237 LBS | SYSTOLIC BLOOD PRESSURE: 148 MMHG

## 2025-01-09 DIAGNOSIS — O35.00X0 FETAL CNS MALFORMATION IN PREGNANCY, SINGLE GESTATION (HHS-HCC): ICD-10-CM

## 2025-01-09 DIAGNOSIS — Z3A.32 32 WEEKS GESTATION OF PREGNANCY (HHS-HCC): Primary | ICD-10-CM

## 2025-01-09 DIAGNOSIS — O13.3 GESTATIONAL HYPERTENSION, THIRD TRIMESTER (HHS-HCC): ICD-10-CM

## 2025-01-09 LAB
ALBUMIN SERPL BCP-MCNC: 3.6 G/DL (ref 3.4–5)
ALP SERPL-CCNC: 76 U/L (ref 33–110)
ALT SERPL W P-5'-P-CCNC: 5 U/L (ref 7–45)
ANION GAP SERPL CALC-SCNC: 14 MMOL/L (ref 10–20)
AST SERPL W P-5'-P-CCNC: 10 U/L (ref 9–39)
BILIRUB SERPL-MCNC: 0.3 MG/DL (ref 0–1.2)
BUN SERPL-MCNC: 11 MG/DL (ref 6–23)
CALCIUM SERPL-MCNC: 9.2 MG/DL (ref 8.6–10.6)
CHLORIDE SERPL-SCNC: 107 MMOL/L (ref 98–107)
CO2 SERPL-SCNC: 22 MMOL/L (ref 21–32)
CREAT SERPL-MCNC: 0.56 MG/DL (ref 0.5–1.05)
CREAT UR-MCNC: 33.5 MG/DL (ref 20–320)
EGFRCR SERPLBLD CKD-EPI 2021: >90 ML/MIN/1.73M*2
ERYTHROCYTE [DISTWIDTH] IN BLOOD BY AUTOMATED COUNT: 14.6 % (ref 11.5–14.5)
GLUCOSE SERPL-MCNC: 110 MG/DL (ref 74–99)
HCT VFR BLD AUTO: 34.9 % (ref 36–46)
HGB BLD-MCNC: 11.3 G/DL (ref 12–16)
MCH RBC QN AUTO: 27.8 PG (ref 26–34)
MCHC RBC AUTO-ENTMCNC: 32.4 G/DL (ref 32–36)
MCV RBC AUTO: 86 FL (ref 80–100)
NRBC BLD-RTO: 0 /100 WBCS (ref 0–0)
PLATELET # BLD AUTO: 191 X10*3/UL (ref 150–450)
POTASSIUM SERPL-SCNC: 3.6 MMOL/L (ref 3.5–5.3)
PROT SERPL-MCNC: 6.2 G/DL (ref 6.4–8.2)
PROT UR-ACNC: 5 MG/DL (ref 5–24)
PROT/CREAT UR: 0.15 MG/MG CREAT (ref 0–0.17)
RBC # BLD AUTO: 4.06 X10*6/UL (ref 4–5.2)
REFLEX ADDED, ANEMIA PANEL: NORMAL
SODIUM SERPL-SCNC: 139 MMOL/L (ref 136–145)
WBC # BLD AUTO: 8.4 X10*3/UL (ref 4.4–11.3)

## 2025-01-09 PROCEDURE — 82570 ASSAY OF URINE CREATININE: CPT

## 2025-01-09 PROCEDURE — 99213 OFFICE O/P EST LOW 20 MIN: CPT | Performed by: OBSTETRICS & GYNECOLOGY

## 2025-01-09 PROCEDURE — 85027 COMPLETE CBC AUTOMATED: CPT

## 2025-01-09 PROCEDURE — 36415 COLL VENOUS BLD VENIPUNCTURE: CPT

## 2025-01-09 PROCEDURE — 80053 COMPREHEN METABOLIC PANEL: CPT

## 2025-01-09 NOTE — PROGRESS NOTES
Maternal Fetal Medicine Follow Up Note  Date of Service: 25     Summary:   24 year old  at 32w0d presenting for [facility] follow up visit.  - Dilated CSP, no other intracranial abnormalities on ultrasound  - S/p fetal MRI, confirmed dilated CSP, no cyst or midline abnormalities, no additional intracranial abnormalities  - FGR diagnosed at 28 weeks, 8%, stable at 8% on , currently undergoing twice weekly  testing  - Diagnosed with gestational hypertension on  with low mild range BP in office over 2 visits >20 weeks  - Following with [facility] for prenatal care    Patient reports she is feeling well today, no complaints. Mild fatigue which has been normal and stable for her in the third trimester. Denies vaginal bleeding, leaking fluid, or contractions. Feeling active fetal movement.     Denies headache, changes in vision, or shortness of breath, denies changes in swelling or right upper quadrant or epigastric pain. Cough improving with conservative measures discussed last week.        Labs/ Prior Imaging reviewed:   24 wga:  -The CSP is not clearly present. The frontal horns appear fused across the midline. The corpus callosum appears present, there is no ventriculomegaly and the remainder of  the neuroanatomy appears normal. The midface appears normal.     25 wga:  - Dating error corrected to ARMANDO 3/6/25  - A targeted anatomic survey was indicated for neurosonography  - Fetal biometry is consistent with the stated gestational age, in the low normal range by correction of the ARMANDO  - Detailed anatomic evaluation of the fetal brain/ventricles, face, heart/outflow tracts and chest anatomy, abdominal organ specific anatomy, number/length/architecture of  limbs and detailed evaluation of the umbilical cord and placenta and other fetal anatomy as clinically indicated was performed.  - The CSP measures at the upper limits of normal at 9.6 cm. Septae Lamimae were see and the anterior horns to appear separate from  the CSP. The preston mater appears  intact. Placement of the anterior horns is normal suggesting the corpus callosum(CC) is present. The corpus callosum is seen and the PCA and cingulate gyrus seen over  the CC. The CC does look thinned but is seen throughout it's length. Cortical development appears normal for the GA. The posterior fossa appears normal.  - No new issues were identified on this comprehensive survey within limitations of sonographic evaluation at this gestational age. The placenta and adnexa appear within  normal today.  - Today's anatomic survey was completed (distal spine views)     28 wga:  Fetal MRI  IMPRESSION:  1. The study was limited due to severe fetal motion artifacts.  2. The cavum septum pellucidum measures up to 1.1 cm, which is in the  upper limits/mildly enlarged although both septal leaflets are  visualized. No definite/detectable focal lesion is seen in this  limited evaluation. However, the possibility of tiny CSP cysts can  not be completely excluded and  head ultrasound is advised  and may provide more information.  3. With the limitations, the remaining of the fetal anatomy is  apparently within normal limits, as detailed above.  Follow Up Ultrasound  - Decreased interval fetal growth. The Ac is at the 31%, and the EFW is at the 8% percentile. Biometry is symmetrically low  - Normal doppler indices with an RI at the 50% percentile  - BPP 8/8, normal amniotic fluid volume  - The area of the CSP was again evaluated today. Measurements are now above upper limit of normal at 1.1cm. What is felt to be the septae laminae are again suspected  to be spaced due to this dilation but otherwise are normal in appearance. It is possible there is a CSP cyst which may mimic the septae laminae. The third and fourth  ventricles are normal in appearance. The anterior horns of the lateral ventricles articulate with the area of the CSP in the expected configuration. There are no  other  intracranial anomalies observed today, though imaging is limited by fetal positioning.  - Transvaginal evaluation was not performed given patient will be undergoing fetal MRI  this afternoon. There is normal cortical development for the GA.  - No new issues were identified on this comprehensive survey within limitations of sonographic evaluation at this gestational age. The placenta and adnexa appear within  normal today.     30 wga:  Unusually wide CSP on ultrasound confirmed with MRI and MRI showing no midline abnormalities or cyst. FGR noted on the previous evaluation. BMI 36, rr cfDNA, normal  diabetic screening. She presents for BPP/doppler evaluation.  - Normal amniotic fluid volume  - BPP 8/8  - Normal Doppler indices    31 wga:  - Breech lie  - normal AFV  - BPP 8/8  - CSP again 11mm which is stable but just above the normal range    Component      Latest Ref Rng 1/2/2025   GLUCOSE      74 - 99 mg/dL 71 (L)    SODIUM      136 - 145 mmol/L 139    POTASSIUM      3.5 - 5.3 mmol/L 3.9    CHLORIDE      98 - 107 mmol/L 108 (H)    Bicarbonate      21 - 32 mmol/L 21    Anion Gap      10 - 20 mmol/L 14    Blood Urea Nitrogen      6 - 23 mg/dL 9    Creatinine      0.50 - 1.05 mg/dL 0.49 (L)    EGFR      >60 mL/min/1.73m*2 >90    Calcium      8.6 - 10.6 mg/dL 9.2    Albumin      3.4 - 5.0 g/dL 4.1    Alkaline Phosphatase      33 - 110 U/L 76    Total Protein      6.4 - 8.2 g/dL 6.8    AST      9 - 39 U/L 12    Bilirubin Total      0.0 - 1.2 mg/dL 0.3    ALT      7 - 45 U/L 8    WBC      4.4 - 11.3 x10*3/uL 8.2    nRBC      0.0 - 0.0 /100 WBCs 0.0    RBC      4.00 - 5.20 x10*6/uL 4.26    HEMOGLOBIN      12.0 - 16.0 g/dL 12.0    HEMATOCRIT      36.0 - 46.0 % 36.4    MCV      80 - 100 fL 85    MCH      26.0 - 34.0 pg 28.2    MCHC      32.0 - 36.0 g/dL 33.0    RED CELL DISTRIBUTION WIDTH      11.5 - 14.5 % 14.5    Platelets      150 - 450 x10*3/uL 201    Total Protein, Urine      5 - 24 mg/dL 11    Creatinine, Urine  Random      20.0 - 320.0 mg/dL 70.9    T. Protein/Creatinine Ratio      0.00 - 0.17 mg/mg Creat 0.16        Objective:  Vitals:    25 1555   BP: 148/85   Weight: 108 kg (237 lb)     Repeat BP: 135/87    Physical Exam:  Gen - no acute distress  Resp - non labored breathing  Cardio - warm and well perfused  Extrem - symmetric, non erythematous  Abd - gravid, non tender  Neuro - alert and oriented, no gross defects    NST: 135 baseline, + 15 x 15 accelerations, - decelerations, moderate variability  TOCO: acontractile     Assessment and Plan:  24 year old  at 32w0d presenting for [facility] follow up visit, pregnancy complicated by fetal dilated CSP and FGR diagnosed at 28 weeks.     Dilated CSP  - No other intracranial abnormalities identified on MRI, stable measurements over recent imaging  - Reviewed expected excellent prognosis for this condition in isolation  - Plan for head ultrasound and  evaluation after delivery     FGR  - Weekly BPP and Dopplers ongoing, alternating with NSTs as below, no findings suggestive of placental insufficiency to date     Gestational Hypertension  - Diagnosed at 31 weeks given mild range blood pressures >4 hours apart (separate days) >20 weeks gestation in absence of evidence or diagnosis of chronic hypertension prior to pregnancy  - Normal serum baseline labs and negative baseline proteinuria evaluation  - BP at home all normotensive, initial BP in office today mild range, taken within 5 minutes of arrival, repeat after NST normotensive  - HELLP labs and urine proteinuria evaluation last week within normal limits, no evidence of end organ damage  - No signs/symptoms of worsening disease today  - NST reactive today, reassuring testing  - Continue twice weekly testing BPP/NST alternating  - Weekly prenatal care visits and preeclampsia labs, labs sent today  - Delivery at 37w0d, sooner for other maternal or fetal indications     Prenatal Care  - Following with [facility] for care  -  S/p tdap, considering RSV, will comm  - Tdap given today  - RSV discussed today, declines today though will consider and let us know next week  - Rh positive  - Postpartum birth control: discussed, currently undecided, will consider     Return visit 1 week for BPP and prenatal visit/BP check     Patient seen and counseled with Dr. Gilda Park MD  MFM Fellow

## 2025-01-13 ENCOUNTER — HOSPITAL ENCOUNTER (OUTPATIENT)
Dept: RADIOLOGY | Facility: HOSPITAL | Age: 25
Discharge: HOME | End: 2025-01-13
Payer: COMMERCIAL

## 2025-01-13 DIAGNOSIS — O36.5930 MATERNAL CARE FOR OTHER KNOWN OR SUSPECTED POOR FETAL GROWTH, THIRD TRIMESTER, NOT APPLICABLE OR UNSPECIFIED: ICD-10-CM

## 2025-01-13 DIAGNOSIS — Z36.89 SCREENING, ANTENATAL, FOR FETAL ANATOMIC SURVEY (HHS-HCC): ICD-10-CM

## 2025-01-13 PROCEDURE — 76815 OB US LIMITED FETUS(S): CPT

## 2025-01-13 PROCEDURE — 76820 UMBILICAL ARTERY ECHO: CPT

## 2025-01-13 PROCEDURE — 76819 FETAL BIOPHYS PROFIL W/O NST: CPT | Performed by: OBSTETRICS & GYNECOLOGY

## 2025-01-13 PROCEDURE — 76820 UMBILICAL ARTERY ECHO: CPT | Performed by: OBSTETRICS & GYNECOLOGY

## 2025-01-13 PROCEDURE — 76815 OB US LIMITED FETUS(S): CPT | Performed by: OBSTETRICS & GYNECOLOGY

## 2025-01-16 ENCOUNTER — LAB REQUISITION (OUTPATIENT)
Dept: LAB | Facility: HOSPITAL | Age: 25
End: 2025-01-16
Payer: COMMERCIAL

## 2025-01-16 ENCOUNTER — PROCEDURE VISIT (OUTPATIENT)
Dept: OBSTETRICS AND GYNECOLOGY | Facility: HOSPITAL | Age: 25
End: 2025-01-16
Payer: COMMERCIAL

## 2025-01-16 ENCOUNTER — ROUTINE PRENATAL (OUTPATIENT)
Dept: MATERNAL FETAL MEDICINE | Facility: HOSPITAL | Age: 25
End: 2025-01-16
Payer: COMMERCIAL

## 2025-01-16 VITALS — SYSTOLIC BLOOD PRESSURE: 144 MMHG | DIASTOLIC BLOOD PRESSURE: 84 MMHG | WEIGHT: 240.2 LBS

## 2025-01-16 DIAGNOSIS — O13.3 GESTATIONAL HYPERTENSION, THIRD TRIMESTER (HHS-HCC): ICD-10-CM

## 2025-01-16 DIAGNOSIS — O35.00X0 FETAL CNS MALFORMATION IN PREGNANCY, SINGLE GESTATION (HHS-HCC): ICD-10-CM

## 2025-01-16 DIAGNOSIS — O13.3 GESTATIONAL (PREGNANCY-INDUCED) HYPERTENSION WITHOUT SIGNIFICANT PROTEINURIA, THIRD TRIMESTER (HHS-HCC): ICD-10-CM

## 2025-01-16 DIAGNOSIS — O13.3 GESTATIONAL HYPERTENSION, THIRD TRIMESTER (HHS-HCC): Primary | ICD-10-CM

## 2025-01-16 DIAGNOSIS — Z3A.33 33 WEEKS GESTATION OF PREGNANCY (HHS-HCC): Primary | ICD-10-CM

## 2025-01-16 LAB
ALBUMIN SERPL BCP-MCNC: 3.6 G/DL (ref 3.4–5)
ALP SERPL-CCNC: 76 U/L (ref 33–110)
ALT SERPL W P-5'-P-CCNC: 7 U/L (ref 7–45)
ANION GAP SERPL CALC-SCNC: 15 MMOL/L (ref 10–20)
AST SERPL W P-5'-P-CCNC: 11 U/L (ref 9–39)
BILIRUB SERPL-MCNC: 0.3 MG/DL (ref 0–1.2)
BUN SERPL-MCNC: 9 MG/DL (ref 6–23)
CALCIUM SERPL-MCNC: 9.6 MG/DL (ref 8.6–10.6)
CHLORIDE SERPL-SCNC: 107 MMOL/L (ref 98–107)
CO2 SERPL-SCNC: 22 MMOL/L (ref 21–32)
CREAT SERPL-MCNC: 0.57 MG/DL (ref 0.5–1.05)
CREAT UR-MCNC: 21.2 MG/DL (ref 20–320)
EGFRCR SERPLBLD CKD-EPI 2021: >90 ML/MIN/1.73M*2
ERYTHROCYTE [DISTWIDTH] IN BLOOD BY AUTOMATED COUNT: 14.5 % (ref 11.5–14.5)
GLUCOSE SERPL-MCNC: 98 MG/DL (ref 74–99)
HCT VFR BLD AUTO: 34.4 % (ref 36–46)
HGB BLD-MCNC: 11.3 G/DL (ref 12–16)
HOLD SPECIMEN: NORMAL
MCH RBC QN AUTO: 28 PG (ref 26–34)
MCHC RBC AUTO-ENTMCNC: 32.8 G/DL (ref 32–36)
MCV RBC AUTO: 85 FL (ref 80–100)
NRBC BLD-RTO: 0 /100 WBCS (ref 0–0)
PLATELET # BLD AUTO: 196 X10*3/UL (ref 150–450)
POTASSIUM SERPL-SCNC: 3.5 MMOL/L (ref 3.5–5.3)
PROT SERPL-MCNC: 6.4 G/DL (ref 6.4–8.2)
PROT UR-ACNC: <4 MG/DL (ref 5–24)
PROT/CREAT UR: ABNORMAL MG/G{CREAT}
RBC # BLD AUTO: 4.03 X10*6/UL (ref 4–5.2)
SODIUM SERPL-SCNC: 140 MMOL/L (ref 136–145)
WBC # BLD AUTO: 7.6 X10*3/UL (ref 4.4–11.3)

## 2025-01-16 PROCEDURE — 99212 OFFICE O/P EST SF 10 MIN: CPT | Performed by: OBSTETRICS & GYNECOLOGY

## 2025-01-16 PROCEDURE — 80053 COMPREHEN METABOLIC PANEL: CPT | Mod: OUT | Performed by: STUDENT IN AN ORGANIZED HEALTH CARE EDUCATION/TRAINING PROGRAM

## 2025-01-16 PROCEDURE — 84156 ASSAY OF PROTEIN URINE: CPT | Mod: OUT | Performed by: STUDENT IN AN ORGANIZED HEALTH CARE EDUCATION/TRAINING PROGRAM

## 2025-01-16 PROCEDURE — 59025 FETAL NON-STRESS TEST: CPT | Performed by: OBSTETRICS & GYNECOLOGY

## 2025-01-16 PROCEDURE — 85027 COMPLETE CBC AUTOMATED: CPT | Mod: OUT | Performed by: STUDENT IN AN ORGANIZED HEALTH CARE EDUCATION/TRAINING PROGRAM

## 2025-01-16 PROCEDURE — 99212 OFFICE O/P EST SF 10 MIN: CPT | Mod: 25 | Performed by: OBSTETRICS & GYNECOLOGY

## 2025-01-16 NOTE — PROGRESS NOTES
Patient here for an NST  [place] and US applied to patient  Continuous fetal monitoring for at least 20 minutes completed  EFM tracing reactive  EFM tracing reviewed and approved by Dr. Chan

## 2025-01-16 NOTE — PROGRESS NOTES
Maternal Fetal Medicine Follow Up Note  Date of Service: 25     Summary:   24 year old  at 33w0d presenting for [facility] follow up visit.    - Dilated CSP, no other intracranial abnormalities on ultrasound  - S/p fetal MRI, confirmed dilated CSP, no cyst or midline abnormalities, no additional intracranial abnormalities  - FGR diagnosed at 28 weeks, 8%, stable at 8% on , currently undergoing twice weekly  testing  - Diagnosed with gestational hypertension on  with low mild range BP in office over 2 visits >20 weeks  - Following with [facility] for prenatal care      Patient reports she is feeling well today, no complaints.  Denies vaginal bleeding, leaking fluid, or contractions. Feeling active fetal movement.     Denies headache, changes in vision, or shortness of breath, denies changes in swelling or right upper quadrant or epigastric pain.  Less coughing.       Labs/ Prior Imaging reviewed:   24 wga:  -The CSP is not clearly present. The frontal horns appear fused across the midline. The corpus callosum appears present, there is no ventriculomegaly and the remainder of  the neuroanatomy appears normal. The midface appears normal.     25 wga:  - Dating error corrected to ARMANDO 3/6/25  - A targeted anatomic survey was indicated for neurosonography  - Fetal biometry is consistent with the stated gestational age, in the low normal range by correction of the ARMANDO  - Detailed anatomic evaluation of the fetal brain/ventricles, face, heart/outflow tracts and chest anatomy, abdominal organ specific anatomy, number/length/architecture of  limbs and detailed evaluation of the umbilical cord and placenta and other fetal anatomy as clinically indicated was performed.  - The CSP measures at the upper limits of normal at 9.6 cm. Septae Lamimae were see and the anterior horns to appear separate from the CSP. The preston mater appears  intact. Placement of the anterior horns is normal suggesting the corpus callosum(CC) is  present. The corpus callosum is seen and the PCA and cingulate gyrus seen over  the CC. The CC does look thinned but is seen throughout it's length. Cortical development appears normal for the GA. The posterior fossa appears normal.  - No new issues were identified on this comprehensive survey within limitations of sonographic evaluation at this gestational age. The placenta and adnexa appear within  normal today.  - Today's anatomic survey was completed (distal spine views)     28 wga:  Fetal MRI  IMPRESSION:  1. The study was limited due to severe fetal motion artifacts.  2. The cavum septum pellucidum measures up to 1.1 cm, which is in the  upper limits/mildly enlarged although both septal leaflets are  visualized. No definite/detectable focal lesion is seen in this  limited evaluation. However, the possibility of tiny CSP cysts can  not be completely excluded and  head ultrasound is advised  and may provide more information.  3. With the limitations, the remaining of the fetal anatomy is  apparently within normal limits, as detailed above.  Follow Up Ultrasound  - Decreased interval fetal growth. The Ac is at the 31%, and the EFW is at the 8% percentile. Biometry is symmetrically low  - Normal doppler indices with an RI at the 50% percentile  - BPP 8/8, normal amniotic fluid volume  - The area of the CSP was again evaluated today. Measurements are now above upper limit of normal at 1.1cm. What is felt to be the septae laminae are again suspected  to be spaced due to this dilation but otherwise are normal in appearance. It is possible there is a CSP cyst which may mimic the septae laminae. The third and fourth  ventricles are normal in appearance. The anterior horns of the lateral ventricles articulate with the area of the CSP in the expected configuration. There are no other  intracranial anomalies observed today, though imaging is limited by fetal positioning.  - Transvaginal evaluation was not  performed given patient will be undergoing fetal MRI  this afternoon. There is normal cortical development for the GA.  - No new issues were identified on this comprehensive survey within limitations of sonographic evaluation at this gestational age. The placenta and adnexa appear within  normal today.     30 wga:  Unusually wide CSP on ultrasound confirmed with MRI and MRI showing no midline abnormalities or cyst. FGR noted on the previous evaluation. BMI 36, rr cfDNA, normal  diabetic screening. She presents for BPP/doppler evaluation.  - Normal amniotic fluid volume  - BPP 8/8  - Normal Doppler indices    31 wga:  - Breech lie  - normal AFV  - BPP 8/8  - CSP again 11mm which is stable but just above the normal range    Component      Latest Ref Rng 1/9/2025   GLUCOSE      74 - 99 mg/dL 110 (H)    SODIUM      136 - 145 mmol/L 139    POTASSIUM      3.5 - 5.3 mmol/L 3.6    CHLORIDE      98 - 107 mmol/L 107    Bicarbonate      21 - 32 mmol/L 22    Anion Gap      10 - 20 mmol/L 14    Blood Urea Nitrogen      6 - 23 mg/dL 11    Creatinine      0.50 - 1.05 mg/dL 0.56    EGFR      >60 mL/min/1.73m*2 >90    Calcium      8.6 - 10.6 mg/dL 9.2    Albumin      3.4 - 5.0 g/dL 3.6    Alkaline Phosphatase      33 - 110 U/L 76    Total Protein      6.4 - 8.2 g/dL 6.2 (L)    AST      9 - 39 U/L 10    Bilirubin Total      0.0 - 1.2 mg/dL 0.3    ALT      7 - 45 U/L 5 (L)    WBC      4.4 - 11.3 x10*3/uL 8.4    nRBC      0.0 - 0.0 /100 WBCs 0.0    RBC      4.00 - 5.20 x10*6/uL 4.06    HEMOGLOBIN      12.0 - 16.0 g/dL 11.3 (L)    HEMATOCRIT      36.0 - 46.0 % 34.9 (L)    MCV      80 - 100 fL 86    MCH      26.0 - 34.0 pg 27.8    MCHC      32.0 - 36.0 g/dL 32.4    RED CELL DISTRIBUTION WIDTH      11.5 - 14.5 % 14.6 (H)    Platelets      150 - 450 x10*3/uL 191    Total Protein, Urine      5 - 24 mg/dL 5    Creatinine, Urine Random      20.0 - 320.0 mg/dL 33.5    T. Protein/Creatinine Ratio      0.00 - 0.17 mg/mg Creat 0.15    Reflex  added, Anemia panel No reflex.       Objective:  Vitals:    25 1503   BP: 144/84   Weight: 109 kg (240 lb 3.2 oz)     Physical Exam:  Gen - no acute distress  Resp - non labored breathing  Cardio - warm and well perfused  Extrem - symmetric, non erythematous  Abd - gravid, non tender  Neuro - alert and oriented, no gross defects    NST: 130 baseline, + 15 x 15 accelerations, - decelerations, moderate variability  TOCO: acontractile     Assessment and Plan:  24 year old  at 33w0d presenting for [facility] follow up visit, pregnancy complicated by fetal dilated CSP and FGR diagnosed at 28 weeks.     Dilated CSP  - No other intracranial abnormalities identified on MRI, stable measurements over recent imaging  - Expected expected excellent prognosis for this condition in isolation  - Plan for head ultrasound and  evaluation after delivery     FGR  - Weekly BPP and Dopplers ongoing, alternating with NSTs as below, no findings suggestive of placental insufficiency to date.       Gestational Hypertension  - Diagnosed at 31 weeks given mild range blood pressures >4 hours apart (separate days) >20 weeks gestation in absence of evidence or diagnosis of chronic hypertension prior to pregnancy  - Normal serum baseline labs and negative baseline proteinuria evaluation  - BP at home all 130/80's  - HELLP labs and urine proteinuria evaluation last week within normal limits, no evidence of end organ damage  - No signs/symptoms of worsening disease today  - NST reactive today, reassuring testing  - Continue twice weekly testing BPP/NST alternating  - Weekly prenatal care visits and preeclampsia labs, labs sent today  - Delivery at 37w0d, sooner for other maternal or fetal indications     Prenatal Care  - Following with [facility] for care  - S/p tdap, RSV declined  - Rh positive  - Postpartum birth control: discussed, currently undecided, will consider     Return visit 1 week for BPP and prenatal visit/BP check  PEC labs sent  today        Shaista Chan M.D.  [facility]  Division of Maternal Fetal Medicine  Clinical   Dept. of Reproductive Biology, [facility]  Office phone- [phone]  Nurse coordinator Smiley Hendricks- [phone]

## 2025-01-20 ENCOUNTER — APPOINTMENT (OUTPATIENT)
Dept: RADIOLOGY | Facility: HOSPITAL | Age: 25
End: 2025-01-20
Payer: COMMERCIAL

## 2025-01-20 ENCOUNTER — HOSPITAL ENCOUNTER (OUTPATIENT)
Dept: RADIOLOGY | Facility: HOSPITAL | Age: 25
Discharge: HOME | End: 2025-01-20
Payer: COMMERCIAL

## 2025-01-20 DIAGNOSIS — Z36.89 SCREENING, ANTENATAL, FOR FETAL ANATOMIC SURVEY (HHS-HCC): ICD-10-CM

## 2025-01-20 PROCEDURE — 76815 OB US LIMITED FETUS(S): CPT

## 2025-01-20 PROCEDURE — 76820 UMBILICAL ARTERY ECHO: CPT | Performed by: STUDENT IN AN ORGANIZED HEALTH CARE EDUCATION/TRAINING PROGRAM

## 2025-01-20 PROCEDURE — 76815 OB US LIMITED FETUS(S): CPT | Performed by: STUDENT IN AN ORGANIZED HEALTH CARE EDUCATION/TRAINING PROGRAM

## 2025-01-20 PROCEDURE — 76820 UMBILICAL ARTERY ECHO: CPT

## 2025-01-20 PROCEDURE — 76819 FETAL BIOPHYS PROFIL W/O NST: CPT | Performed by: STUDENT IN AN ORGANIZED HEALTH CARE EDUCATION/TRAINING PROGRAM

## 2025-01-23 ENCOUNTER — PROCEDURE VISIT (OUTPATIENT)
Dept: OBSTETRICS AND GYNECOLOGY | Facility: HOSPITAL | Age: 25
End: 2025-01-23
Payer: COMMERCIAL

## 2025-01-23 ENCOUNTER — ROUTINE PRENATAL (OUTPATIENT)
Dept: MATERNAL FETAL MEDICINE | Facility: HOSPITAL | Age: 25
End: 2025-01-23
Payer: COMMERCIAL

## 2025-01-23 VITALS — SYSTOLIC BLOOD PRESSURE: 145 MMHG | DIASTOLIC BLOOD PRESSURE: 83 MMHG | WEIGHT: 239 LBS

## 2025-01-23 DIAGNOSIS — O13.3 GESTATIONAL HYPERTENSION, THIRD TRIMESTER (HHS-HCC): ICD-10-CM

## 2025-01-23 DIAGNOSIS — O36.5990 FETAL GROWTH RESTRICTION ANTEPARTUM (HHS-HCC): ICD-10-CM

## 2025-01-23 DIAGNOSIS — Z3A.34 34 WEEKS GESTATION OF PREGNANCY (HHS-HCC): ICD-10-CM

## 2025-01-23 DIAGNOSIS — O35.00X0 FETAL CNS MALFORMATION IN PREGNANCY, SINGLE GESTATION (HHS-HCC): Primary | ICD-10-CM

## 2025-01-23 PROCEDURE — 85027 COMPLETE CBC AUTOMATED: CPT | Mod: OUT | Performed by: STUDENT IN AN ORGANIZED HEALTH CARE EDUCATION/TRAINING PROGRAM

## 2025-01-23 PROCEDURE — 59025 FETAL NON-STRESS TEST: CPT | Performed by: STUDENT IN AN ORGANIZED HEALTH CARE EDUCATION/TRAINING PROGRAM

## 2025-01-23 PROCEDURE — 80053 COMPREHEN METABOLIC PANEL: CPT | Mod: OUT | Performed by: STUDENT IN AN ORGANIZED HEALTH CARE EDUCATION/TRAINING PROGRAM

## 2025-01-23 PROCEDURE — 82570 ASSAY OF URINE CREATININE: CPT | Mod: OUT | Performed by: STUDENT IN AN ORGANIZED HEALTH CARE EDUCATION/TRAINING PROGRAM

## 2025-01-23 PROCEDURE — 99214 OFFICE O/P EST MOD 30 MIN: CPT | Mod: GC | Performed by: STUDENT IN AN ORGANIZED HEALTH CARE EDUCATION/TRAINING PROGRAM

## 2025-01-23 NOTE — PROGRESS NOTES
Alicia Jarquin needs to be seen by her PCP before meds are refilled.   Patient here for an NST  [place] and US applied to patient  Continuous fetal monitoring for at least 20 minutes completed  EFM tracing reactive  EFM tracing reviewed and approved by Dr. Vivian Garcia, RN

## 2025-01-23 NOTE — PROGRESS NOTES
Maternal Fetal Medicine Follow Up Note  Date of Service: 25     Summary:   24 year old  at  presenting for [facility] follow up visit.    - Fetus with dilated CSP, no other intracranial abnormalities on ultrasound  - S/p fetal MRI, confirmed dilated CSP, no cyst or midline abnormalities, no additional intracranial abnormalities  - FGR diagnosed at 28 weeks, 8%, stable at 8% on , currently undergoing twice weekly  testing  - Diagnosed with gestational hypertension on  with low mild range BP in office over 2 visits >20 weeks  - Following with [facility] for prenatal care    Patient feels well today. Denies vaginal bleeding, leaking fluid, or contractions. Reports active fetal movement. Denies headache, changes in vision, or shortness of breath, denies changes in swelling or right upper quadrant or epigastric pain.  Post-nasal drip cough has resolved.       Labs/ Prior Imaging reviewed:   24 wga:  -The CSP is not clearly present. The frontal horns appear fused across the midline. The corpus callosum appears present, there is no ventriculomegaly and the remainder of  the neuroanatomy appears normal. The midface appears normal.     25 wga:  - Dating error corrected to ARMANDO 3/6/25  - A targeted anatomic survey was indicated for neurosonography  - Fetal biometry is consistent with the stated gestational age, in the low normal range by correction of the ARMANDO  - Detailed anatomic evaluation of the fetal brain/ventricles, face, heart/outflow tracts and chest anatomy, abdominal organ specific anatomy, number/length/architecture of  limbs and detailed evaluation of the umbilical cord and placenta and other fetal anatomy as clinically indicated was performed.  - The CSP measures at the upper limits of normal at 9.6 cm. Septae Lamimae were see and the anterior horns to appear separate from the CSP. The preston mater appears  intact. Placement of the anterior horns is normal suggesting the corpus callosum(CC) is present.  The corpus callosum is seen and the PCA and cingulate gyrus seen over  the CC. The CC does look thinned but is seen throughout it's length. Cortical development appears normal for the GA. The posterior fossa appears normal.  - No new issues were identified on this comprehensive survey within limitations of sonographic evaluation at this gestational age. The placenta and adnexa appear within  normal today.  - Today's anatomic survey was completed (distal spine views)     28 wga:  Fetal MRI  IMPRESSION:  1. The study was limited due to severe fetal motion artifacts.  2. The cavum septum pellucidum measures up to 1.1 cm, which is in the  upper limits/mildly enlarged although both septal leaflets are  visualized. No definite/detectable focal lesion is seen in this  limited evaluation. However, the possibility of tiny CSP cysts can  not be completely excluded and  head ultrasound is advised  and may provide more information.  3. With the limitations, the remaining of the fetal anatomy is  apparently within normal limits, as detailed above.  Follow Up Ultrasound  - Decreased interval fetal growth. The Ac is at the 31%, and the EFW is at the 8% percentile. Biometry is symmetrically low  - Normal doppler indices with an RI at the 50% percentile  - BPP 8/8, normal amniotic fluid volume  - The area of the CSP was again evaluated today. Measurements are now above upper limit of normal at 1.1cm. What is felt to be the septae laminae are again suspected  to be spaced due to this dilation but otherwise are normal in appearance. It is possible there is a CSP cyst which may mimic the septae laminae. The third and fourth  ventricles are normal in appearance. The anterior horns of the lateral ventricles articulate with the area of the CSP in the expected configuration. There are no other  intracranial anomalies observed today, though imaging is limited by fetal positioning.  - Transvaginal evaluation was not performed given  patient will be undergoing fetal MRI  this afternoon. There is normal cortical development for the GA.  - No new issues were identified on this comprehensive survey within limitations of sonographic evaluation at this gestational age. The placenta and adnexa appear within  normal today.     30 wga:  Unusually wide CSP on ultrasound confirmed with MRI and MRI showing no midline abnormalities or cyst. FGR noted on the previous evaluation. BMI 36, rr cfDNA, normal  diabetic screening. She presents for BPP/doppler evaluation.  - Normal amniotic fluid volume  - BPP 8/8  - Normal Doppler indices    31 wga:  - Breech lie  - normal AFV  - BPP 8/8  - CSP again 11mm which is stable but just above the normal range    Component      Latest Ref Rng 1/16/2025   GLUCOSE      74 - 99 mg/dL 98    SODIUM      136 - 145 mmol/L 140    POTASSIUM      3.5 - 5.3 mmol/L 3.5    CHLORIDE      98 - 107 mmol/L 107    Bicarbonate      21 - 32 mmol/L 22    Anion Gap      10 - 20 mmol/L 15    Blood Urea Nitrogen      6 - 23 mg/dL 9    Creatinine      0.50 - 1.05 mg/dL 0.57    EGFR      >60 mL/min/1.73m*2 >90    Calcium      8.6 - 10.6 mg/dL 9.6    Albumin      3.4 - 5.0 g/dL 3.6    Alkaline Phosphatase      33 - 110 U/L 76    Total Protein      6.4 - 8.2 g/dL 6.4    AST      9 - 39 U/L 11    Bilirubin Total      0.0 - 1.2 mg/dL 0.3    ALT      7 - 45 U/L 7    WBC      4.4 - 11.3 x10*3/uL 7.6    nRBC      0.0 - 0.0 /100 WBCs 0.0    RBC      4.00 - 5.20 x10*6/uL 4.03    HEMOGLOBIN      12.0 - 16.0 g/dL 11.3 (L)    HEMATOCRIT      36.0 - 46.0 % 34.4 (L)    MCV      80 - 100 fL 85    MCH      26.0 - 34.0 pg 28.0    MCHC      32.0 - 36.0 g/dL 32.8    RED CELL DISTRIBUTION WIDTH      11.5 - 14.5 % 14.5    Platelets      150 - 450 x10*3/uL 196    Total Protein, Urine      5 - 24 mg/dL <4 (L)    Creatinine, Urine Random      20.0 - 320.0 mg/dL 21.2    T. Protein/Creatinine Ratio --       Objective:  Vitals:    01/23/25 1504   BP: 145/83   Weight: 108 kg  (239 lb)     Physical Exam:  Gen - no acute distress  Resp - non labored breathing  Cardio - warm and well perfused  Extrem - symmetric, non erythematous, non edematous  Abd - gravid, non tender  Neuro - alert and oriented, no gross defects    NST: 130 baseline, + 15 x 15 accelerations, - decelerations, moderate variability  TOCO: acontractile     Assessment and Plan:  24 year old  at 34w0d presenting for [facility] follow up visit, pregnancy complicated by fetal dilated CSP and FGR diagnosed at 28 weeks, gestational hypertension.     Dilated CSP  - No other intracranial abnormalities identified on MRI, stable measurements over recent imaging  - Expected expected excellent prognosis for this condition in isolation  - Plan for head ultrasound and  evaluation after delivery     FGR  - Weekly BPP and Dopplers ongoing, alternating with NSTs as below, no findings suggestive of placental insufficiency to date, ISMAEL this week low normal at 5.7cm, progressive  - Reviewed maternal/fetal indications for consideration of  delivery     Gestational Hypertension  - Diagnosed at 31 weeks given mild range blood pressures >4 hours apart (separate days) >20 weeks gestation in absence of evidence or diagnosis of chronic hypertension prior to pregnancy  - Normal serum baseline labs and negative baseline proteinuria evaluation  - BP at home all 130/80's  - HELLP labs and urine proteinuria evaluation last week within normal limits, no evidence of end organ damage  - No signs/symptoms of worsening disease today  - NST reactive today, reassuring testing  - Continue twice weekly testing BPP/NST alternating  - Weekly prenatal care visits and preeclampsia labs, labs sent today  - Delivery at 37w0d, sooner for other maternal or fetal indications     Prenatal Care  - Following with [facility] for care  - S/p tdap, RSV declined  - Rh positive  - Postpartum birth control: remains undecided, discussed again today  - Offered GBS, prefers to  wait until next week     Return visit 1 week for BPP and prenatal visit/BP check  PEC labs sent today     Patient seen and counseled with Dr. Varsha Park MD  MFM Fellow

## 2025-01-24 ENCOUNTER — LAB REQUISITION (OUTPATIENT)
Dept: LAB | Facility: HOSPITAL | Age: 25
End: 2025-01-24
Payer: COMMERCIAL

## 2025-01-24 DIAGNOSIS — O13.3 GESTATIONAL (PREGNANCY-INDUCED) HYPERTENSION WITHOUT SIGNIFICANT PROTEINURIA, THIRD TRIMESTER (HHS-HCC): ICD-10-CM

## 2025-01-24 LAB
ALBUMIN SERPL BCP-MCNC: 3.6 G/DL (ref 3.4–5)
ALP SERPL-CCNC: 82 U/L (ref 33–110)
ALT SERPL W P-5'-P-CCNC: 6 U/L (ref 7–45)
ANION GAP SERPL CALC-SCNC: 16 MMOL/L (ref 10–20)
AST SERPL W P-5'-P-CCNC: 10 U/L (ref 9–39)
BILIRUB SERPL-MCNC: 0.3 MG/DL (ref 0–1.2)
BUN SERPL-MCNC: 10 MG/DL (ref 6–23)
CALCIUM SERPL-MCNC: 9.4 MG/DL (ref 8.6–10.6)
CHLORIDE SERPL-SCNC: 105 MMOL/L (ref 98–107)
CO2 SERPL-SCNC: 21 MMOL/L (ref 21–32)
CREAT SERPL-MCNC: 0.55 MG/DL (ref 0.5–1.05)
CREAT UR-MCNC: 38.2 MG/DL (ref 20–320)
EGFRCR SERPLBLD CKD-EPI 2021: >90 ML/MIN/1.73M*2
ERYTHROCYTE [DISTWIDTH] IN BLOOD BY AUTOMATED COUNT: 14.8 % (ref 11.5–14.5)
GLUCOSE SERPL-MCNC: 62 MG/DL (ref 74–99)
HCT VFR BLD AUTO: 35 % (ref 36–46)
HGB BLD-MCNC: 11.3 G/DL (ref 12–16)
MCH RBC QN AUTO: 28.9 PG (ref 26–34)
MCHC RBC AUTO-ENTMCNC: 32.3 G/DL (ref 32–36)
MCV RBC AUTO: 90 FL (ref 80–100)
NRBC BLD-RTO: 0 /100 WBCS (ref 0–0)
PLATELET # BLD AUTO: 215 X10*3/UL (ref 150–450)
POTASSIUM SERPL-SCNC: 3.3 MMOL/L (ref 3.5–5.3)
PROT SERPL-MCNC: 6.1 G/DL (ref 6.4–8.2)
PROT UR-ACNC: 4 MG/DL (ref 5–24)
PROT/CREAT UR: 0.1 MG/MG CREAT (ref 0–0.17)
RBC # BLD AUTO: 3.91 X10*6/UL (ref 4–5.2)
SODIUM SERPL-SCNC: 139 MMOL/L (ref 136–145)
WBC # BLD AUTO: 8.5 X10*3/UL (ref 4.4–11.3)

## 2025-01-27 ENCOUNTER — APPOINTMENT (OUTPATIENT)
Dept: RADIOLOGY | Facility: HOSPITAL | Age: 25
End: 2025-01-27
Payer: COMMERCIAL

## 2025-01-27 ENCOUNTER — HOSPITAL ENCOUNTER (OUTPATIENT)
Dept: RADIOLOGY | Facility: HOSPITAL | Age: 25
Discharge: HOME | End: 2025-01-27
Payer: COMMERCIAL

## 2025-01-27 DIAGNOSIS — Z36.89 SCREENING, ANTENATAL, FOR FETAL ANATOMIC SURVEY (HHS-HCC): ICD-10-CM

## 2025-01-27 PROCEDURE — 76820 UMBILICAL ARTERY ECHO: CPT

## 2025-01-27 PROCEDURE — 76820 UMBILICAL ARTERY ECHO: CPT | Performed by: OBSTETRICS & GYNECOLOGY

## 2025-01-27 PROCEDURE — 76819 FETAL BIOPHYS PROFIL W/O NST: CPT | Performed by: OBSTETRICS & GYNECOLOGY

## 2025-01-27 PROCEDURE — 76816 OB US FOLLOW-UP PER FETUS: CPT | Performed by: OBSTETRICS & GYNECOLOGY

## 2025-01-27 PROCEDURE — 76816 OB US FOLLOW-UP PER FETUS: CPT

## 2025-01-30 ENCOUNTER — APPOINTMENT (OUTPATIENT)
Dept: OBSTETRICS AND GYNECOLOGY | Facility: HOSPITAL | Age: 25
End: 2025-01-30
Payer: COMMERCIAL

## 2025-01-31 ENCOUNTER — PROCEDURE VISIT (OUTPATIENT)
Dept: OBSTETRICS AND GYNECOLOGY | Facility: HOSPITAL | Age: 25
End: 2025-01-31
Payer: COMMERCIAL

## 2025-01-31 VITALS — WEIGHT: 242.4 LBS | DIASTOLIC BLOOD PRESSURE: 85 MMHG | SYSTOLIC BLOOD PRESSURE: 132 MMHG

## 2025-01-31 DIAGNOSIS — O13.3 GESTATIONAL HYPERTENSION, THIRD TRIMESTER (HHS-HCC): Primary | ICD-10-CM

## 2025-01-31 DIAGNOSIS — O99.213 OBESITY AFFECTING PREGNANCY IN THIRD TRIMESTER, UNSPECIFIED OBESITY TYPE (HHS-HCC): ICD-10-CM

## 2025-01-31 DIAGNOSIS — O35.00X0 FETAL CNS MALFORMATION IN PREGNANCY, SINGLE GESTATION (HHS-HCC): ICD-10-CM

## 2025-01-31 PROCEDURE — 99211 OFF/OP EST MAY X REQ PHY/QHP: CPT | Performed by: OBSTETRICS & GYNECOLOGY

## 2025-01-31 PROCEDURE — 59025 FETAL NON-STRESS TEST: CPT | Performed by: OBSTETRICS & GYNECOLOGY

## 2025-01-31 NOTE — PROGRESS NOTES
Patient 35w1d here for an NST  [place] and US applied to patient  Continuous fetal monitoring for at least 20 minutes completed  EFM tracing reactive  EFM tracing reviewed and approved by   Patient denies any cramping or bleeding.  Patient was under the impression that the office automatically set up prenatal appts. Nurse made patient aware that she has to get the appointments scheduled and instructed her at the end of the visit to go to the  and set up prenatal appt for next week. Patient verbalized understanding and stated she had no further questions, comments or concerns at this time.    GUILLERMO Robins

## 2025-01-31 NOTE — PROCEDURES
Ena Lott, a  at 35w1d with an ARMANDO of 3/6/2025, by Est. Date of Conception, was seen at [facility] for a nonstress test.    Non-Stress Test   Baseline Fetal Heart Rate for Non-Stress Test: 130 BPM  Variability in Waveform for Non-Stress Test: Moderate  Accelerations in Non-Stress Test: Yes, greater than/equal to 15 bpm, lasting at least 15 seconds  Decelerations in Non-Stress Test: None  Contractions in Non-Stress Test: Not present  Interpretation of Non-Stress Test   Interpretation of Non-Stress Test: Reactive      Gina Kovacs MD

## 2025-02-03 ENCOUNTER — ANESTHESIA (OUTPATIENT)
Dept: OBSTETRICS AND GYNECOLOGY | Facility: HOSPITAL | Age: 25
End: 2025-02-03
Payer: COMMERCIAL

## 2025-02-03 ENCOUNTER — HOSPITAL ENCOUNTER (INPATIENT)
Facility: HOSPITAL | Age: 25
LOS: 4 days | Discharge: HOME | End: 2025-02-07
Attending: OBSTETRICS & GYNECOLOGY | Admitting: SPECIALIST
Payer: COMMERCIAL

## 2025-02-03 ENCOUNTER — HOSPITAL ENCOUNTER (OUTPATIENT)
Dept: RADIOLOGY | Facility: HOSPITAL | Age: 25
Discharge: HOME | End: 2025-02-03
Payer: COMMERCIAL

## 2025-02-03 ENCOUNTER — OFFICE VISIT (OUTPATIENT)
Dept: MATERNAL FETAL MEDICINE | Facility: HOSPITAL | Age: 25
End: 2025-02-03
Payer: COMMERCIAL

## 2025-02-03 ENCOUNTER — ANESTHESIA EVENT (OUTPATIENT)
Dept: OBSTETRICS AND GYNECOLOGY | Facility: HOSPITAL | Age: 25
End: 2025-02-03
Payer: COMMERCIAL

## 2025-02-03 VITALS — SYSTOLIC BLOOD PRESSURE: 152 MMHG | WEIGHT: 239 LBS | DIASTOLIC BLOOD PRESSURE: 86 MMHG

## 2025-02-03 DIAGNOSIS — Z3A.35 35 WEEKS GESTATION OF PREGNANCY (HHS-HCC): ICD-10-CM

## 2025-02-03 DIAGNOSIS — O35.00X0 FETAL CNS MALFORMATION IN PREGNANCY, SINGLE GESTATION (HHS-HCC): ICD-10-CM

## 2025-02-03 DIAGNOSIS — O99.213 OBESITY AFFECTING PREGNANCY IN THIRD TRIMESTER, UNSPECIFIED OBESITY TYPE (HHS-HCC): ICD-10-CM

## 2025-02-03 DIAGNOSIS — O13.3 GESTATIONAL HYPERTENSION, THIRD TRIMESTER (HHS-HCC): Primary | ICD-10-CM

## 2025-02-03 DIAGNOSIS — Z33.1 PREGNANT STATE, INCIDENTAL (HHS-HCC): ICD-10-CM

## 2025-02-03 DIAGNOSIS — Z36.89 SCREENING, ANTENATAL, FOR FETAL ANATOMIC SURVEY (HHS-HCC): ICD-10-CM

## 2025-02-03 PROBLEM — Z34.90 PREGNANCY (HHS-HCC): Status: ACTIVE | Noted: 2025-02-03

## 2025-02-03 LAB
ABO GROUP (TYPE) IN BLOOD: NORMAL
ALBUMIN SERPL BCP-MCNC: 3.6 G/DL (ref 3.4–5)
ALP SERPL-CCNC: 84 U/L (ref 33–110)
ALT SERPL W P-5'-P-CCNC: 5 U/L (ref 7–45)
ANION GAP SERPL CALC-SCNC: 14 MMOL/L (ref 10–20)
ANTIBODY SCREEN: NORMAL
AST SERPL W P-5'-P-CCNC: 9 U/L (ref 9–39)
BILIRUB SERPL-MCNC: 0.4 MG/DL (ref 0–1.2)
BUN SERPL-MCNC: 11 MG/DL (ref 6–23)
CALCIUM SERPL-MCNC: 9.4 MG/DL (ref 8.6–10.6)
CHLORIDE SERPL-SCNC: 109 MMOL/L (ref 98–107)
CO2 SERPL-SCNC: 21 MMOL/L (ref 21–32)
CREAT SERPL-MCNC: 0.57 MG/DL (ref 0.5–1.05)
CREAT UR-MCNC: 83.8 MG/DL (ref 20–320)
EGFRCR SERPLBLD CKD-EPI 2021: >90 ML/MIN/1.73M*2
ERYTHROCYTE [DISTWIDTH] IN BLOOD BY AUTOMATED COUNT: 14.3 % (ref 11.5–14.5)
GLUCOSE SERPL-MCNC: 78 MG/DL (ref 74–99)
HCT VFR BLD AUTO: 33.8 % (ref 36–46)
HGB BLD-MCNC: 11.3 G/DL (ref 12–16)
MCH RBC QN AUTO: 28.5 PG (ref 26–34)
MCHC RBC AUTO-ENTMCNC: 33.4 G/DL (ref 32–36)
MCV RBC AUTO: 85 FL (ref 80–100)
NRBC BLD-RTO: 0 /100 WBCS (ref 0–0)
PLATELET # BLD AUTO: 214 X10*3/UL (ref 150–450)
POTASSIUM SERPL-SCNC: 3.6 MMOL/L (ref 3.5–5.3)
PROT SERPL-MCNC: 6.3 G/DL (ref 6.4–8.2)
PROT UR-ACNC: 15 MG/DL (ref 5–24)
PROT/CREAT UR: 0.18 MG/MG CREAT (ref 0–0.17)
RBC # BLD AUTO: 3.96 X10*6/UL (ref 4–5.2)
RH FACTOR (ANTIGEN D): NORMAL
SODIUM SERPL-SCNC: 140 MMOL/L (ref 136–145)
TREPONEMA PALLIDUM IGG+IGM AB [PRESENCE] IN SERUM OR PLASMA BY IMMUNOASSAY: NONREACTIVE
WBC # BLD AUTO: 10 X10*3/UL (ref 4.4–11.3)

## 2025-02-03 PROCEDURE — 1036F TOBACCO NON-USER: CPT | Performed by: STUDENT IN AN ORGANIZED HEALTH CARE EDUCATION/TRAINING PROGRAM

## 2025-02-03 PROCEDURE — 85027 COMPLETE CBC AUTOMATED: CPT | Performed by: STUDENT IN AN ORGANIZED HEALTH CARE EDUCATION/TRAINING PROGRAM

## 2025-02-03 PROCEDURE — 0501F PRENATAL FLOW SHEET: CPT | Performed by: STUDENT IN AN ORGANIZED HEALTH CARE EDUCATION/TRAINING PROGRAM

## 2025-02-03 PROCEDURE — 36415 COLL VENOUS BLD VENIPUNCTURE: CPT | Performed by: STUDENT IN AN ORGANIZED HEALTH CARE EDUCATION/TRAINING PROGRAM

## 2025-02-03 PROCEDURE — 59050 FETAL MONITOR W/REPORT: CPT

## 2025-02-03 PROCEDURE — 86850 RBC ANTIBODY SCREEN: CPT | Performed by: STUDENT IN AN ORGANIZED HEALTH CARE EDUCATION/TRAINING PROGRAM

## 2025-02-03 PROCEDURE — 3077F SYST BP >= 140 MM HG: CPT | Performed by: STUDENT IN AN ORGANIZED HEALTH CARE EDUCATION/TRAINING PROGRAM

## 2025-02-03 PROCEDURE — 3079F DIAST BP 80-89 MM HG: CPT | Performed by: STUDENT IN AN ORGANIZED HEALTH CARE EDUCATION/TRAINING PROGRAM

## 2025-02-03 PROCEDURE — 82570 ASSAY OF URINE CREATININE: CPT | Performed by: STUDENT IN AN ORGANIZED HEALTH CARE EDUCATION/TRAINING PROGRAM

## 2025-02-03 PROCEDURE — 76820 UMBILICAL ARTERY ECHO: CPT | Performed by: STUDENT IN AN ORGANIZED HEALTH CARE EDUCATION/TRAINING PROGRAM

## 2025-02-03 PROCEDURE — 76819 FETAL BIOPHYS PROFIL W/O NST: CPT | Performed by: STUDENT IN AN ORGANIZED HEALTH CARE EDUCATION/TRAINING PROGRAM

## 2025-02-03 PROCEDURE — 76819 FETAL BIOPHYS PROFIL W/O NST: CPT

## 2025-02-03 PROCEDURE — 3E0P7VZ INTRODUCTION OF HORMONE INTO FEMALE REPRODUCTIVE, VIA NATURAL OR ARTIFICIAL OPENING: ICD-10-PCS | Performed by: STUDENT IN AN ORGANIZED HEALTH CARE EDUCATION/TRAINING PROGRAM

## 2025-02-03 PROCEDURE — 7210000002 HC LABOR PER HOUR

## 2025-02-03 PROCEDURE — 86901 BLOOD TYPING SEROLOGIC RH(D): CPT | Performed by: STUDENT IN AN ORGANIZED HEALTH CARE EDUCATION/TRAINING PROGRAM

## 2025-02-03 PROCEDURE — 76815 OB US LIMITED FETUS(S): CPT

## 2025-02-03 PROCEDURE — 80053 COMPREHEN METABOLIC PANEL: CPT | Performed by: STUDENT IN AN ORGANIZED HEALTH CARE EDUCATION/TRAINING PROGRAM

## 2025-02-03 PROCEDURE — 76815 OB US LIMITED FETUS(S): CPT | Performed by: STUDENT IN AN ORGANIZED HEALTH CARE EDUCATION/TRAINING PROGRAM

## 2025-02-03 PROCEDURE — 2500000004 HC RX 250 GENERAL PHARMACY W/ HCPCS (ALT 636 FOR OP/ED): Performed by: STUDENT IN AN ORGANIZED HEALTH CARE EDUCATION/TRAINING PROGRAM

## 2025-02-03 PROCEDURE — 99222 1ST HOSP IP/OBS MODERATE 55: CPT

## 2025-02-03 PROCEDURE — 87081 CULTURE SCREEN ONLY: CPT | Performed by: STUDENT IN AN ORGANIZED HEALTH CARE EDUCATION/TRAINING PROGRAM

## 2025-02-03 PROCEDURE — 2500000004 HC RX 250 GENERAL PHARMACY W/ HCPCS (ALT 636 FOR OP/ED)

## 2025-02-03 PROCEDURE — 84156 ASSAY OF PROTEIN URINE: CPT | Performed by: STUDENT IN AN ORGANIZED HEALTH CARE EDUCATION/TRAINING PROGRAM

## 2025-02-03 PROCEDURE — 86780 TREPONEMA PALLIDUM: CPT | Performed by: STUDENT IN AN ORGANIZED HEALTH CARE EDUCATION/TRAINING PROGRAM

## 2025-02-03 PROCEDURE — 1120000001 HC OB PRIVATE ROOM DAILY

## 2025-02-03 RX ORDER — OXYTOCIN/0.9 % SODIUM CHLORIDE 30/500 ML
60 PLASTIC BAG, INJECTION (ML) INTRAVENOUS ONCE AS NEEDED
Status: COMPLETED | OUTPATIENT
Start: 2025-02-03 | End: 2025-02-04

## 2025-02-03 RX ORDER — LIDOCAINE HYDROCHLORIDE 10 MG/ML
30 INJECTION, SOLUTION INFILTRATION; PERINEURAL ONCE AS NEEDED
Status: DISCONTINUED | OUTPATIENT
Start: 2025-02-03 | End: 2025-02-04

## 2025-02-03 RX ORDER — METHYLERGONOVINE MALEATE 0.2 MG/ML
0.2 INJECTION INTRAVENOUS ONCE AS NEEDED
Status: DISCONTINUED | OUTPATIENT
Start: 2025-02-03 | End: 2025-02-04

## 2025-02-03 RX ORDER — FENTANYL/ROPIVACAINE/NS/PF 2MCG/ML-.2
0-25 PLASTIC BAG, INJECTION (ML) INJECTION CONTINUOUS
Status: DISCONTINUED | OUTPATIENT
Start: 2025-02-03 | End: 2025-02-04

## 2025-02-03 RX ORDER — SODIUM CHLORIDE, SODIUM LACTATE, POTASSIUM CHLORIDE, CALCIUM CHLORIDE 600; 310; 30; 20 MG/100ML; MG/100ML; MG/100ML; MG/100ML
75 INJECTION, SOLUTION INTRAVENOUS CONTINUOUS
Status: DISCONTINUED | OUTPATIENT
Start: 2025-02-03 | End: 2025-02-04

## 2025-02-03 RX ORDER — HYDRALAZINE HYDROCHLORIDE 20 MG/ML
5 INJECTION INTRAMUSCULAR; INTRAVENOUS ONCE AS NEEDED
Status: DISCONTINUED | OUTPATIENT
Start: 2025-02-03 | End: 2025-02-04

## 2025-02-03 RX ORDER — NIFEDIPINE 10 MG/1
10 CAPSULE ORAL ONCE AS NEEDED
Status: DISCONTINUED | OUTPATIENT
Start: 2025-02-03 | End: 2025-02-04

## 2025-02-03 RX ORDER — OXYTOCIN/0.9 % SODIUM CHLORIDE 30/500 ML
60 PLASTIC BAG, INJECTION (ML) INTRAVENOUS ONCE AS NEEDED
Status: DISCONTINUED | OUTPATIENT
Start: 2025-02-03 | End: 2025-02-04

## 2025-02-03 RX ORDER — PENICILLIN G 3000000 [IU]/50ML
3 INJECTION, SOLUTION INTRAVENOUS EVERY 4 HOURS
Status: DISCONTINUED | OUTPATIENT
Start: 2025-02-03 | End: 2025-02-04

## 2025-02-03 RX ORDER — ONDANSETRON 4 MG/1
4 TABLET, FILM COATED ORAL EVERY 6 HOURS PRN
Status: DISCONTINUED | OUTPATIENT
Start: 2025-02-03 | End: 2025-02-04

## 2025-02-03 RX ORDER — OXYTOCIN/0.9 % SODIUM CHLORIDE 30/500 ML
2-30 PLASTIC BAG, INJECTION (ML) INTRAVENOUS CONTINUOUS
Status: DISCONTINUED | OUTPATIENT
Start: 2025-02-03 | End: 2025-02-04

## 2025-02-03 RX ORDER — OXYTOCIN 10 [USP'U]/ML
10 INJECTION, SOLUTION INTRAMUSCULAR; INTRAVENOUS ONCE AS NEEDED
Status: DISCONTINUED | OUTPATIENT
Start: 2025-02-03 | End: 2025-02-04

## 2025-02-03 RX ORDER — MISOPROSTOL 200 UG/1
800 TABLET ORAL ONCE AS NEEDED
Status: COMPLETED | OUTPATIENT
Start: 2025-02-03 | End: 2025-02-04

## 2025-02-03 RX ORDER — LOPERAMIDE HYDROCHLORIDE 2 MG/1
4 CAPSULE ORAL EVERY 2 HOUR PRN
Status: DISCONTINUED | OUTPATIENT
Start: 2025-02-03 | End: 2025-02-04

## 2025-02-03 RX ORDER — TRANEXAMIC ACID 100 MG/ML
1000 INJECTION, SOLUTION INTRAVENOUS ONCE AS NEEDED
Status: DISCONTINUED | OUTPATIENT
Start: 2025-02-03 | End: 2025-02-04

## 2025-02-03 RX ORDER — CARBOPROST TROMETHAMINE 250 UG/ML
250 INJECTION, SOLUTION INTRAMUSCULAR ONCE AS NEEDED
Status: DISCONTINUED | OUTPATIENT
Start: 2025-02-03 | End: 2025-02-04

## 2025-02-03 RX ORDER — LABETALOL HYDROCHLORIDE 5 MG/ML
20 INJECTION, SOLUTION INTRAVENOUS ONCE AS NEEDED
Status: DISCONTINUED | OUTPATIENT
Start: 2025-02-03 | End: 2025-02-04

## 2025-02-03 RX ORDER — TERBUTALINE SULFATE 1 MG/ML
0.25 INJECTION SUBCUTANEOUS ONCE AS NEEDED
Status: DISCONTINUED | OUTPATIENT
Start: 2025-02-03 | End: 2025-02-04

## 2025-02-03 RX ORDER — ONDANSETRON HYDROCHLORIDE 2 MG/ML
4 INJECTION, SOLUTION INTRAVENOUS EVERY 6 HOURS PRN
Status: DISCONTINUED | OUTPATIENT
Start: 2025-02-03 | End: 2025-02-04

## 2025-02-03 RX ADMIN — Medication 2 MILLI-UNITS/MIN: at 20:23

## 2025-02-03 RX ADMIN — PENICILLIN G 3 MILLION UNITS: 3000000 INJECTION, SOLUTION INTRAVENOUS at 23:32

## 2025-02-03 RX ADMIN — SODIUM CHLORIDE, POTASSIUM CHLORIDE, SODIUM LACTATE AND CALCIUM CHLORIDE 75 ML/HR: 600; 310; 30; 20 INJECTION, SOLUTION INTRAVENOUS at 18:53

## 2025-02-03 RX ADMIN — PENICILLIN G POTASSIUM 5 MILLION UNITS: 5000000 INJECTION, POWDER, FOR SOLUTION INTRAMUSCULAR; INTRAVENOUS at 18:53

## 2025-02-03 SDOH — ECONOMIC STABILITY: FOOD INSECURITY: WITHIN THE PAST 12 MONTHS, THE FOOD YOU BOUGHT JUST DIDN'T LAST AND YOU DIDN'T HAVE MONEY TO GET MORE.: NEVER TRUE

## 2025-02-03 SDOH — ECONOMIC STABILITY: TRANSPORTATION INSECURITY: IN THE PAST 12 MONTHS, HAS LACK OF TRANSPORTATION KEPT YOU FROM MEDICAL APPOINTMENTS OR FROM GETTING MEDICATIONS?: NO

## 2025-02-03 SDOH — HEALTH STABILITY: MENTAL HEALTH: HOW OFTEN DO YOU HAVE SIX OR MORE DRINKS ON ONE OCCASION?: NEVER

## 2025-02-03 SDOH — SOCIAL STABILITY: SOCIAL INSECURITY
WITHIN THE LAST YEAR, HAVE YOU BEEN RAPED OR FORCED TO HAVE ANY KIND OF SEXUAL ACTIVITY BY YOUR PARTNER OR EX-PARTNER?: NO

## 2025-02-03 SDOH — SOCIAL STABILITY: SOCIAL INSECURITY
WITHIN THE LAST YEAR, HAVE YOU BEEN KICKED, HIT, SLAPPED, OR OTHERWISE PHYSICALLY HURT BY YOUR PARTNER OR EX-PARTNER?: NO

## 2025-02-03 SDOH — ECONOMIC STABILITY: FOOD INSECURITY: WITHIN THE PAST 12 MONTHS, YOU WORRIED THAT YOUR FOOD WOULD RUN OUT BEFORE YOU GOT THE MONEY TO BUY MORE.: NEVER TRUE

## 2025-02-03 SDOH — ECONOMIC STABILITY: FOOD INSECURITY: HOW HARD IS IT FOR YOU TO PAY FOR THE VERY BASICS LIKE FOOD, HOUSING, MEDICAL CARE, AND HEATING?: NOT HARD AT ALL

## 2025-02-03 SDOH — SOCIAL STABILITY: SOCIAL INSECURITY: WITHIN THE LAST YEAR, HAVE YOU BEEN HUMILIATED OR EMOTIONALLY ABUSED IN OTHER WAYS BY YOUR PARTNER OR EX-PARTNER?: NO

## 2025-02-03 SDOH — HEALTH STABILITY: MENTAL HEALTH: HOW MANY DRINKS CONTAINING ALCOHOL DO YOU HAVE ON A TYPICAL DAY WHEN YOU ARE DRINKING?: PATIENT DOES NOT DRINK

## 2025-02-03 SDOH — HEALTH STABILITY: MENTAL HEALTH: HOW OFTEN DO YOU HAVE A DRINK CONTAINING ALCOHOL?: NEVER

## 2025-02-03 SDOH — SOCIAL STABILITY: SOCIAL INSECURITY: WITHIN THE LAST YEAR, HAVE YOU BEEN AFRAID OF YOUR PARTNER OR EX-PARTNER?: NO

## 2025-02-03 SDOH — HEALTH STABILITY: MENTAL HEALTH: WERE YOU ABLE TO COMPLETE ALL THE BEHAVIORAL HEALTH SCREENINGS?: NO

## 2025-02-03 SDOH — HEALTH STABILITY: MENTAL HEALTH: REASON FOR INCOMPLETE PSYCHIATRIC SCREENING: OTHER (COMMENT)

## 2025-02-03 SDOH — HEALTH STABILITY: MENTAL HEALTH: CURRENT SMOKER: 0

## 2025-02-03 ASSESSMENT — ACTIVITIES OF DAILY LIVING (ADL)
LACK_OF_TRANSPORTATION: NO
LACK_OF_TRANSPORTATION: NO

## 2025-02-03 ASSESSMENT — PAIN SCALES - GENERAL
PAINLEVEL_OUTOF10: 2
PAINLEVEL_OUTOF10: 0 - NO PAIN

## 2025-02-03 ASSESSMENT — LIFESTYLE VARIABLES
SKIP TO QUESTIONS 9-10: 1
AUDIT-C TOTAL SCORE: 0

## 2025-02-03 NOTE — CARE PLAN
The patient's goals for the shift include healthy mom healthy baby    The clinical goals for the shift include tolerate IOL, reassuring fetal heart tracing

## 2025-02-03 NOTE — H&P
OB Admission H&P    Assessment/Plan    Ena Lott is a 24 y.o.  at 35w4d, ARMANDO: 3/6/2025, by Est. Date of Conception, who presents for Induction of Labor.    Plan  -Admit to L&D, consented  -T&S, CBC, and Syphilis  -Epidural at patient request  -Recheck as clinically indicated by maternal or fetal status  -Plan to initiate induction with pitocin and CRB. Will defer Cytotec at this time given severe FGR with EFW < 1%ile.     gHTN  -asx  -MRBP on admission  -admission HELLP labs neg     Fetal Status  -NST reactive, reassuring   -Presentation cephalic based on ultrasound  -EFW 1689 g (<1%), .4 (<1%) by ultrasound on   -GBS unknown, for PCN ppx, GBS swab collected     Pregnancy Notables  -sFGR 1689 g (<1%) and AC <1%. Normal doppler indices  -gHTN, dx at 31wga, see above     Postpartum  Contraception Plan: patient declined    Pregnancy Problems (from 24 to present)       Problem Noted Diagnosed Resolved    Labor and delivery indication for care or intervention ([facility]) 2/3/2025 by Dejan Burgos MD  No    Priority:  Medium       Gestational hypertension, third trimester ([facility]) 1/3/2025 by Shaista Chan MD  No    Priority:  Medium       Overview Signed 2025  4:40 PM by Nena Park MD     Diagnosed at 31 weeks given mild range blood pressures >4 hours apart (separate days) >20 weeks gestation in absence of evidence or diagnosis of chronic hypertension prior to pregnancy. Normal serum baseline labs and negative baseline proteinuria evaluation.    Weekly visits and labs, twice weekly fetal testing.     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, single gestation ([facility]) 1/3/2025 by Shaista Chan MD  No    Priority:  Medium       Obesity affecting pregnancy ([facility]) 1/3/2025 by Shaista Chan MD  No    Priority:  Medium               Subjective   No acute concerns. Good fetal movement.  Denies vaginal bleeding.,  Denies contractions.      OB History    Para Term  AB Living   1 0 0 0 0 0   SAB IAB Ectopic Multiple Live Births   0 0 0 0 0      # Outcome Date GA Lbr Lam/2nd Weight Sex Type Anes PTL Lv   1 Current                No past surgical history on file.    Social History     Tobacco Use    Smoking status: Never    Smokeless tobacco: Never   Substance Use Topics    Alcohol use: Not on file       No Known Allergies    Medications Prior to Admission   Medication Sig Dispense Refill Last Dose/Taking    prenatal vit,calc76-iron-folic (Prenatabs Rx) 29 mg iron- 1 mg tablet Take 1 tablet by mouth once daily.        Objective     Last Vitals  Temp Pulse Resp BP MAP O2 Sat   36.4 °C (97.5 °F) 93 16 (!) 148/82 109       Blood Pressures         2/3/2025  1456             BP: 148/82             Physical Exam  General: NAD, mood appropriate  Cardiopulmonary: warm and well perfused, breathing comfortably on room air  Abdomen: Gravid, non-tender  Extremities: Symmetric  Speculum Exam: deferred  Cervix: Closed /0 /-3      Fetal Monitoring  Baseline: 135 bpm, Variability: moderate,  Accelerations: present and Decelerations: none  Uterine Activity: Irregular contractions  Interpretation: Reactive    Bedside ultrasound: Yes    Labs in chart were reviewed.   Results from last 7 days   Lab Units 25  1553   WBC AUTO x10*3/uL 10.0   HEMOGLOBIN g/dL 11.3*   HEMATOCRIT % 33.8*   PLATELETS AUTO x10*3/uL 214   AST U/L 9   ALT U/L 5*   CREATININE mg/dL 0.57        Prenatal labs reviewed, not remarkable.

## 2025-02-04 ENCOUNTER — ANESTHESIA EVENT (OUTPATIENT)
Dept: OBSTETRICS AND GYNECOLOGY | Facility: HOSPITAL | Age: 25
End: 2025-02-04
Payer: COMMERCIAL

## 2025-02-04 ENCOUNTER — ANESTHESIA (OUTPATIENT)
Dept: OBSTETRICS AND GYNECOLOGY | Facility: HOSPITAL | Age: 25
End: 2025-02-04
Payer: COMMERCIAL

## 2025-02-04 PROCEDURE — 2500000004 HC RX 250 GENERAL PHARMACY W/ HCPCS (ALT 636 FOR OP/ED): Performed by: STUDENT IN AN ORGANIZED HEALTH CARE EDUCATION/TRAINING PROGRAM

## 2025-02-04 PROCEDURE — 2500000002 HC RX 250 W HCPCS SELF ADMINISTERED DRUGS (ALT 637 FOR MEDICARE OP, ALT 636 FOR OP/ED): Performed by: STUDENT IN AN ORGANIZED HEALTH CARE EDUCATION/TRAINING PROGRAM

## 2025-02-04 PROCEDURE — 7210000002 HC LABOR PER HOUR

## 2025-02-04 PROCEDURE — 2500000004 HC RX 250 GENERAL PHARMACY W/ HCPCS (ALT 636 FOR OP/ED)

## 2025-02-04 PROCEDURE — 59409 OBSTETRICAL CARE: CPT | Performed by: OBSTETRICS & GYNECOLOGY

## 2025-02-04 PROCEDURE — 1120000001 HC OB PRIVATE ROOM DAILY

## 2025-02-04 PROCEDURE — 7100000016 HC LABOR RECOVERY PER HOUR

## 2025-02-04 PROCEDURE — 2500000004 HC RX 250 GENERAL PHARMACY W/ HCPCS (ALT 636 FOR OP/ED): Performed by: ANESTHESIOLOGY

## 2025-02-04 PROCEDURE — 2500000001 HC RX 250 WO HCPCS SELF ADMINISTERED DRUGS (ALT 637 FOR MEDICARE OP): Performed by: STUDENT IN AN ORGANIZED HEALTH CARE EDUCATION/TRAINING PROGRAM

## 2025-02-04 PROCEDURE — 59409 OBSTETRICAL CARE: CPT | Performed by: STUDENT IN AN ORGANIZED HEALTH CARE EDUCATION/TRAINING PROGRAM

## 2025-02-04 PROCEDURE — 10907ZC DRAINAGE OF AMNIOTIC FLUID, THERAPEUTIC FROM PRODUCTS OF CONCEPTION, VIA NATURAL OR ARTIFICIAL OPENING: ICD-10-PCS | Performed by: OBSTETRICS & GYNECOLOGY

## 2025-02-04 PROCEDURE — 0UQMXZZ REPAIR VULVA, EXTERNAL APPROACH: ICD-10-PCS | Performed by: OBSTETRICS & GYNECOLOGY

## 2025-02-04 PROCEDURE — 0KQM0ZZ REPAIR PERINEUM MUSCLE, OPEN APPROACH: ICD-10-PCS | Performed by: OBSTETRICS & GYNECOLOGY

## 2025-02-04 PROCEDURE — 3700000014 EPIDURAL BLOCK: Mod: GC

## 2025-02-04 RX ORDER — METHYLERGONOVINE MALEATE 0.2 MG/ML
0.2 INJECTION INTRAVENOUS ONCE AS NEEDED
Status: CANCELLED | OUTPATIENT
Start: 2025-02-04

## 2025-02-04 RX ORDER — FENTANYL/ROPIVACAINE/NS/PF 2MCG/ML-.2
0-25 PLASTIC BAG, INJECTION (ML) INJECTION CONTINUOUS
Status: DISCONTINUED | OUTPATIENT
Start: 2025-02-04 | End: 2025-02-04

## 2025-02-04 RX ORDER — ONDANSETRON 4 MG/1
4 TABLET, FILM COATED ORAL EVERY 6 HOURS PRN
Status: DISCONTINUED | OUTPATIENT
Start: 2025-02-04 | End: 2025-02-07 | Stop reason: HOSPADM

## 2025-02-04 RX ORDER — LABETALOL HYDROCHLORIDE 5 MG/ML
20 INJECTION, SOLUTION INTRAVENOUS ONCE AS NEEDED
Status: CANCELLED | OUTPATIENT
Start: 2025-02-04

## 2025-02-04 RX ORDER — NIFEDIPINE 10 MG/1
10 CAPSULE ORAL ONCE AS NEEDED
Status: CANCELLED | OUTPATIENT
Start: 2025-02-04

## 2025-02-04 RX ORDER — ONDANSETRON HYDROCHLORIDE 2 MG/ML
4 INJECTION, SOLUTION INTRAVENOUS EVERY 6 HOURS PRN
Status: DISCONTINUED | OUTPATIENT
Start: 2025-02-04 | End: 2025-02-07 | Stop reason: HOSPADM

## 2025-02-04 RX ORDER — MISOPROSTOL 200 UG/1
800 TABLET ORAL ONCE AS NEEDED
Status: CANCELLED | OUTPATIENT
Start: 2025-02-04

## 2025-02-04 RX ORDER — TRANEXAMIC ACID 100 MG/ML
1000 INJECTION, SOLUTION INTRAVENOUS ONCE AS NEEDED
Status: CANCELLED | OUTPATIENT
Start: 2025-02-04 | End: 2025-02-07

## 2025-02-04 RX ORDER — OXYTOCIN/0.9 % SODIUM CHLORIDE 30/500 ML
60 PLASTIC BAG, INJECTION (ML) INTRAVENOUS ONCE AS NEEDED
Status: CANCELLED | OUTPATIENT
Start: 2025-02-04

## 2025-02-04 RX ORDER — LIDOCAINE HYDROCHLORIDE 10 MG/ML
INJECTION, SOLUTION INFILTRATION; PERINEURAL AS NEEDED
Status: DISCONTINUED | OUTPATIENT
Start: 2025-02-04 | End: 2025-02-04

## 2025-02-04 RX ORDER — POLYETHYLENE GLYCOL 3350 17 G/17G
17 POWDER, FOR SOLUTION ORAL 2 TIMES DAILY PRN
Status: CANCELLED | OUTPATIENT
Start: 2025-02-04

## 2025-02-04 RX ORDER — CARBOPROST TROMETHAMINE 250 UG/ML
250 INJECTION, SOLUTION INTRAMUSCULAR ONCE AS NEEDED
Status: CANCELLED | OUTPATIENT
Start: 2025-02-04

## 2025-02-04 RX ORDER — DIPHENHYDRAMINE HCL 25 MG
25 CAPSULE ORAL EVERY 6 HOURS PRN
Status: DISCONTINUED | OUTPATIENT
Start: 2025-02-04 | End: 2025-02-07 | Stop reason: HOSPADM

## 2025-02-04 RX ORDER — OXYTOCIN 10 [USP'U]/ML
10 INJECTION, SOLUTION INTRAMUSCULAR; INTRAVENOUS ONCE AS NEEDED
Status: CANCELLED | OUTPATIENT
Start: 2025-02-04

## 2025-02-04 RX ORDER — IBUPROFEN 600 MG/1
600 TABLET ORAL EVERY 6 HOURS
Status: DISCONTINUED | OUTPATIENT
Start: 2025-02-04 | End: 2025-02-07 | Stop reason: HOSPADM

## 2025-02-04 RX ORDER — LOPERAMIDE HYDROCHLORIDE 2 MG/1
4 CAPSULE ORAL EVERY 2 HOUR PRN
Status: CANCELLED | OUTPATIENT
Start: 2025-02-04

## 2025-02-04 RX ORDER — DIPHENHYDRAMINE HYDROCHLORIDE 50 MG/ML
25 INJECTION INTRAMUSCULAR; INTRAVENOUS EVERY 6 HOURS PRN
Status: DISCONTINUED | OUTPATIENT
Start: 2025-02-04 | End: 2025-02-07 | Stop reason: HOSPADM

## 2025-02-04 RX ORDER — HYDRALAZINE HYDROCHLORIDE 20 MG/ML
5 INJECTION INTRAMUSCULAR; INTRAVENOUS ONCE AS NEEDED
Status: CANCELLED | OUTPATIENT
Start: 2025-02-04

## 2025-02-04 RX ORDER — ENOXAPARIN SODIUM 100 MG/ML
40 INJECTION SUBCUTANEOUS EVERY 24 HOURS
Status: CANCELLED | OUTPATIENT
Start: 2025-02-05

## 2025-02-04 RX ORDER — SIMETHICONE 80 MG
80 TABLET,CHEWABLE ORAL 4 TIMES DAILY PRN
Status: CANCELLED | OUTPATIENT
Start: 2025-02-04

## 2025-02-04 RX ORDER — ADHESIVE BANDAGE
10 BANDAGE TOPICAL
Status: CANCELLED | OUTPATIENT
Start: 2025-02-04

## 2025-02-04 RX ORDER — NALBUPHINE HYDROCHLORIDE 10 MG/ML
10 INJECTION INTRAMUSCULAR; INTRAVENOUS; SUBCUTANEOUS ONCE
Status: COMPLETED | OUTPATIENT
Start: 2025-02-04 | End: 2025-02-04

## 2025-02-04 RX ORDER — ACETAMINOPHEN 325 MG/1
975 TABLET ORAL EVERY 6 HOURS
Status: DISCONTINUED | OUTPATIENT
Start: 2025-02-04 | End: 2025-02-07 | Stop reason: HOSPADM

## 2025-02-04 RX ADMIN — SODIUM CHLORIDE, POTASSIUM CHLORIDE, SODIUM LACTATE AND CALCIUM CHLORIDE 500 ML: 600; 310; 30; 20 INJECTION, SOLUTION INTRAVENOUS at 22:39

## 2025-02-04 RX ADMIN — PENICILLIN G 3 MILLION UNITS: 3000000 INJECTION, SOLUTION INTRAVENOUS at 16:52

## 2025-02-04 RX ADMIN — ACETAMINOPHEN 975 MG: 325 TABLET ORAL at 22:42

## 2025-02-04 RX ADMIN — IBUPROFEN 600 MG: 600 TABLET, FILM COATED ORAL at 22:42

## 2025-02-04 RX ADMIN — NALBUPHINE HYDROCHLORIDE 10 MG: 10 INJECTION, SOLUTION INTRAMUSCULAR; INTRAVENOUS; SUBCUTANEOUS at 08:23

## 2025-02-04 RX ADMIN — Medication 10 ML/HR: at 14:54

## 2025-02-04 RX ADMIN — SODIUM CHLORIDE, POTASSIUM CHLORIDE, SODIUM LACTATE AND CALCIUM CHLORIDE 75 ML/HR: 600; 310; 30; 20 INJECTION, SOLUTION INTRAVENOUS at 10:05

## 2025-02-04 RX ADMIN — PENICILLIN G 3 MILLION UNITS: 3000000 INJECTION, SOLUTION INTRAVENOUS at 03:58

## 2025-02-04 RX ADMIN — SODIUM CHLORIDE, POTASSIUM CHLORIDE, SODIUM LACTATE AND CALCIUM CHLORIDE 500 ML: 600; 310; 30; 20 INJECTION, SOLUTION INTRAVENOUS at 14:00

## 2025-02-04 RX ADMIN — MISOPROSTOL 800 MCG: 200 TABLET ORAL at 20:24

## 2025-02-04 RX ADMIN — Medication 60 MILLI-UNITS/MIN: at 20:10

## 2025-02-04 RX ADMIN — PENICILLIN G 3 MILLION UNITS: 3000000 INJECTION, SOLUTION INTRAVENOUS at 08:50

## 2025-02-04 RX ADMIN — PENICILLIN G 3 MILLION UNITS: 3000000 INJECTION, SOLUTION INTRAVENOUS at 13:13

## 2025-02-04 SDOH — HEALTH STABILITY: MENTAL HEALTH: SUICIDAL BEHAVIOR (LIFETIME): NO

## 2025-02-04 SDOH — HEALTH STABILITY: MENTAL HEALTH: CURRENT SMOKER: 0

## 2025-02-04 SDOH — HEALTH STABILITY: MENTAL HEALTH: WISH TO BE DEAD (PAST 1 MONTH): NO

## 2025-02-04 SDOH — HEALTH STABILITY: MENTAL HEALTH: NON-SPECIFIC ACTIVE SUICIDAL THOUGHTS (PAST 1 MONTH): NO

## 2025-02-04 ASSESSMENT — PAIN SCALES - GENERAL
PAINLEVEL_OUTOF10: 0 - NO PAIN
PAINLEVEL_OUTOF10: 3
PAINLEVEL_OUTOF10: 0 - NO PAIN
PAINLEVEL_OUTOF10: 7
PAINLEVEL_OUTOF10: 0 - NO PAIN
PAINLEVEL_OUTOF10: 7
PAINLEVEL_OUTOF10: 0 - NO PAIN

## 2025-02-04 NOTE — SIGNIFICANT EVENT
To bedside for cervical exam.    SVE 1/30/-3  FHT Cat I    Suspect CRB not in correct place despite tension on balloon at time of placement. Recommend additional CRB attempt, patient amenable but would like a break first. Offered cytotec while waiting; though fetus is severe FGR, has been tolerating pitocin well with Cat I tracing throughout. Patient declines cytotec due to potential risk of NRFHT.     Will reattempt CRB placement when patient amenable and restart pitocin at that time.    Ana España MD  PGY-4, Obstetrics and Gynecology

## 2025-02-04 NOTE — ANESTHESIA PREPROCEDURE EVALUATION
Patient: Ena Lott    Evaluation Method: In-person visit    Procedure Information    Date: 02/03/25  Procedure: Labor Consult         Relevant Problems   Cardiac (within normal limits)      Pulmonary (within normal limits)      Neuro (within normal limits)      GI (within normal limits)      /Renal (within normal limits)      Liver (within normal limits)      Endocrine   (+) Obesity affecting pregnancy (HHS-HCC)      Hematology (within normal limits)      Musculoskeletal (within normal limits)      HEENT (within normal limits)      ID (within normal limits)      Skin (within normal limits)      GYN   (+) Pregnancy (HHS-HCC)       Clinical information reviewed:   Tobacco  Allergies  Meds               NPO Detail:  Still eating at time of interview    Visit Vitals  BP (!) 152/81   Pulse 86   Temp 36.7 °C (98.1 °F)   Resp 16         OB/Gyn Evaluation    Present Pregnancy    Patient is pregnant now (G1 @ 35.4 for IOL. gHTN, FGR).  (+) , fetal growth restriction (<1%)   Obstetric History    (+) hypertensive disorder of pregnancy - gestational hypertension              Physical Exam    Airway  Mallampati: II  TM distance: >3 FB  Neck ROM: full     Cardiovascular - normal exam  Rhythm: regular  Rate: normal     Dental    Pulmonary - normal exam     Abdominal - normal exam             Anesthesia Plan    History of general anesthesia?: no  History of complications of general anesthesia?: unknown/emergency    ASA 3   (Risks and benefits of neuraxial anesthesia explained. Patient has no further questions at this time. )  The patient is not a current smoker.  Patient was not previously instructed to abstain from smoking on day of procedure.  Patient did not smoke on day of procedure.    Anesthetic plan and risks discussed with patient.  Use of blood products discussed with patient who consented to blood products.    Plan discussed with CRNA.

## 2025-02-04 NOTE — SIGNIFICANT EVENT
"Labor Progress Note    Subjective: Pt resting comfortably in bed. Amenable to cervical exam at this time and CRB placement if appropriate.    Objective:  Vitals: BP (!) 141/71   Pulse 80   Temp 36.1 °C (97 °F) (Temporal)   Resp 18   Ht 1.651 m (5' 5\")   Wt 108 kg (238 lb 15.7 oz)   SpO2 99%   BMI 39.77 kg/m²     SVE: 1/30/-3  FHT: 130/moderate/+accels/-deccels  [place]: no ctx on toco, some recent irritability     A/P:  CRB placed under direct visualization  CRB placement confirmed via ultrasound with Dr. España  Scant blood noted in CRB tubing after placement, to monitor  CEFM, currently Category I  Epidural as requested    Leonor Crawford MD, MPH  Obstetrics & Gynecology, PGY-1   "

## 2025-02-04 NOTE — SIGNIFICANT EVENT
"Labor Progress Note    Subjective: To room for routine check. Amenable to cervical exam at this time. Reporting some non-painful contractions intermittently.    Objective:  Vitals: /75   Pulse 65   Temp 36.1 °C (97 °F) (Temporal)   Resp 16   Ht 1.651 m (5' 5\")   Wt 108 kg (238 lb 15.7 oz)   SpO2 96%   BMI 39.77 kg/m²     SVE: 1/30/-3  FHT: 125/moderate/+accels/-deccels   [place]: no ctx on monitor    A/P:  Plan to stop pitocin at this time, currently at 12  For cytotec and recheck before CRB replacement  Pt not feeling contractions  CEFM, currently Category I  Epidural as requested    Leonor Crawford MD, MPH  Obstetrics & Gynecology, PGY-1    "

## 2025-02-04 NOTE — SIGNIFICANT EVENT
Labor check    S: Patient resting comfortably. Some cramping has improved with CRB now out. Feeling contractions. Denies HA, chest pain, SOB, RUQ pain, changes in vision.     SVE: 3/60/-2  FHT: 120/mod/+accel/1x variable decel  [place]: q5-6min    A/P: Ena is a 24 y.o.  at 35w5d undergoing IOL     IOL  - latent labor  - Offered AROM at time of exam. Discussed benefit of AROM at time of CRB coming out. Patient would like to defer at this time.   - Pitocin per protocol  - GBS unknown, on PCN ppx   - epidural at patient request   - next exam as clinically indicated by maternal or fetal status    gHTN  -asx  -MRBP on admission  -admission HELLP labs neg     Fetal Wellbeing  -CEFM, Cat 2 currently though overall reassuring with low suspicion for acid-base disturbance of the fetus given moderate variability and accelerations    Discussed with Dr. Frank and Dr. Brandy Jansen MD  PGY1, Obstetrics and Gynecology

## 2025-02-04 NOTE — PROGRESS NOTES
Intrapartum Progress Note    Assessment/Plan   Ean Lott is a 24 y.o.  at 35w4d. ARMANDO: 3/6/2025, by Est. Date of Conception admitted for IOL in the setting of severe FGR with oligohydramnios and gestational HTN    IOL  2nd CRB in place, first came out without cervical change   Desires NCB, pain control at patient request   CEFM, currently Cat I  Will AROM after CRB out  GBS unknown, PCN ppx    Severe FGR  EFW 1689 g (<1%), .4 (<1%) by ultrasound on  with oligohydramnios  Previously discussed increased risk of CS in setting of severe FGR pending FHT, although currently Cat I    Gestational HTN  HELLP labs normal on admission    Postpartum  Declines contraception  Needs breast pump Rx    Discussed with and to be seen by Dr. Brandy Jansen MD  PGY1, Obstetrics and Gynecology    Subjective   Feeling well overall, no concerns. Feeling contractions, becoming more intense. Feeling cramping as well. CRB in place.     Objective   Last Vitals:  Temp Pulse Resp BP MAP Pulse Ox   36.9 °C (98.4 °F) 71 16 (!) 143/70 98 99 %     Vitals Min/Max Last 24 Hours:  Temp  Min: 36.1 °C (97 °F)  Max: 36.9 °C (98.4 °F)  Pulse  Min: 64  Max: 105  Resp  Min: 16  Max: 18  BP  Min: 121/58  Max: 152/86  MAP (mmHg)  Min: 83  Max: 110    Intake/Output:  No intake or output data in the 24 hours ending 25 0757    Physical Examination:  General: no acute distress  HEENT: normocephalic, atraumatic  Heart: warm and well perfused  Lungs: breathing comfortably on room air  Abdomen: gravid  Extremities: moving all extremities  Neuro: awake and conversant  Psych: appropriate mood and affect    FHT: 130/mod/+accel/-decel  [place]: q2min per patient report     Lab Review:  Labs in chart were reviewed.

## 2025-02-04 NOTE — ANESTHESIA PROCEDURE NOTES
Epidural Block    Patient location during procedure: OB  Start time: 2/4/2025 2:21 PM  End time: 2/4/2025 3:02 PM  Reason for block: labor analgesia  Staffing  Performed: resident and attending   Authorized by: True Hsu MD    Performed by: True Hsu MD    Preanesthetic Checklist  Completed: patient identified, IV checked, risks and benefits discussed, surgical consent, pre-op evaluation, timeout performed and sterile techniques followed  Block Timeout  RN/Licensed healthcare professional reads aloud to the Anesthesia provider and entire team: Patient identity, procedure with side and site, patient position, and as applicable the availability of implants/special equipment/special requirements.  Patient on coagulant treatment: no  Timeout performed at: 2/4/2025 2:18 PM  Block Placement  Patient position: sitting  Sterility prep: cap, drape, gloves, hand and mask  Sedation level: no sedation  Patient monitoring: blood pressure, continuous pulse oximetry and heart rate  Approach: midline  Local numbing: lidocaine 1% to skin and subcutaneous tissues  Vertebral space: lumbar  Lumbar location: L3-L4  Epidural  Loss of resistance technique: saline  Guidance: landmark technique        Needle  Needle type: Tuohy   Needle gauge: 17  Needle length: 8.9cm  Needle insertion depth: 7 cm  Catheter type: stylet  Catheter size: 19 G  Catheter at skin depth: 12 cm  Catheter securement method: liquid medical adhesive and clear occlusive dressing    Test dose: lidocaine 1.5% with epinephrine 1-to-200,000  Test dose: lidocaine 1.5% with epinephrine 1-to-200,000  Test dose result: no positive test dose    PCEA  Medication concentration used: 0.2% Ropivacaine with 2 mcg/mL Fentanyl  Dose (mL): 10  Lockout (minutes): 15  1-Hour Limit (boluses/hr): 4  Basal Rate: 10        Assessment  Block outcome: pain improved  Events: no positive test dose  Procedure assessment: patient tolerated procedure well with no immediate  complications  Additional Notes  Sensory level is T10 on the right and T6 on the left

## 2025-02-04 NOTE — SIGNIFICANT EVENT
"Labor Check      S: Patient resting with support team at bedside. Desires cervical exam and AROM         O:  BP (!) 153/86   Pulse 91   Temp 36.7 °C (98.1 °F)   Resp 16   Ht 1.651 m (5' 5\")   Wt 108 kg (238 lb 15.7 oz)   SpO2 98%   BMI 39.77 kg/m²      SVE: 3/60/-3     NST:   Baseline: 130  Variability: mod  Accels: +  Decels: -     [place]: irritable          A/P  -  Latent Labor.   - s/p AROM for scant fluid, c/w oligo   - CEFM Cat I currently  - Pitocin currently infusing. Increase per protocol  - gHTN- normotensive to mild range, labs wnl   - Continue position changes, as tolerated  - GBS unknown- continue PCN   - Recheck as clinically indicated by maternal or fetal status or PRN   - Anticipate NSVB.      Pt. d/w BIA Gordillo MD  PGY-II, Obstetrics & Gynecology   [facility]       "

## 2025-02-04 NOTE — ANESTHESIA PREPROCEDURE EVALUATION
Patient: Ena Lott    Evaluation Method: In-person visit    Procedure Information    Date: 02/03/25  Procedure: Labor Consult         Relevant Problems   Cardiac (within normal limits)      Pulmonary (within normal limits)      Neuro (within normal limits)      GI (within normal limits)      /Renal (within normal limits)      Liver (within normal limits)      Endocrine   (+) Obesity affecting pregnancy (HHS-HCC)      Hematology (within normal limits)      Musculoskeletal (within normal limits)      HEENT (within normal limits)      ID (within normal limits)      Skin (within normal limits)      GYN   (+) Pregnancy (HHS-HCC)       Clinical information reviewed:   Tobacco  Allergies  Meds               NPO Detail:  Still eating at time of interview    Visit Vitals  BP (!) 140/90   Pulse 105   Temp 36.7 °C (98.1 °F)   Resp 18         OB/Gyn Evaluation    Present Pregnancy    Patient is pregnant now (G1 @ 35.4 for IOL. gHTN, FGR).  (+) , fetal growth restriction (<1%)   Obstetric History    (+) hypertensive disorder of pregnancy - gestational hypertension              Physical Exam    Airway  Mallampati: II  TM distance: >3 FB  Neck ROM: full     Cardiovascular - normal exam  Rhythm: regular  Rate: normal     Dental    Pulmonary - normal exam     Abdominal - normal exam             Anesthesia Plan    History of general anesthesia?: no  History of complications of general anesthesia?: unknown/emergency    ASA 3   (Risks and benefits of neuraxial anesthesia explained. Patient has no further questions at this time. )  The patient is not a current smoker.  Patient was not previously instructed to abstain from smoking on day of procedure.  Patient did not smoke on day of procedure.    Anesthetic plan and risks discussed with patient.  Use of blood products discussed with patient who consented to blood products.    Plan discussed with CRNA.

## 2025-02-04 NOTE — PROGRESS NOTES
Intrapartum Progress Note    Assessment/Plan   Ena Lott is a 24 y.o.  at 35w4d. ARMANDO: 3/6/2025, by Est. Date of Conception admitted for IOL in the setting of severe FGR with oligohydramnios and gestational HTN    IOL  CRB placed, will start pitocin  Epidural on patient's request  CEFM, currently Cat I  Will AROM after CRB out  GBS unknown, PCN ppx    Severe FGR  EFW 1689 g (<1%), .4 (<1%) by ultrasound on  with oligohydramnios  Discussed increased risk of CS in setting of severe FGR pending FHT, although currently Cat I    Gestational HTN  HELLP labs normal on admission    Postpartum  Declines contraception  Needs breast pump Rx    Seen and discussed with Dr. Gladys España MD  PGY-4, Obstetrics and Gynecology      Subjective   Feeling well overall, no concerns. Ready to begin induction    Objective   Last Vitals:  Temp Pulse Resp BP MAP Pulse Ox   36.7 °C (98.1 °F) 105 16 (!) 140/90 110 98 %     Vitals Min/Max Last 24 Hours:  Temp  Min: 36.2 °C (97.2 °F)  Max: 36.7 °C (98.1 °F)  Pulse  Min: 83  Max: 105  Resp  Min: 16  Max: 18  BP  Min: 140/90  Max: 152/86  MAP (mmHg)  Min: 109  Max: 110    Intake/Output:  No intake or output data in the 24 hours ending 25    Physical Examination:  General: no acute distress  HEENT: normocephalic, atraumatic  Heart: warm and well perfused  Lungs: breathing comfortably on room air  Abdomen: gravid  Extremities: moving all extremities  Neuro: awake and conversant  Psych: appropriate mood and affect    : Patient was placed in dorsal lithotomy position, a speculum was placed, and a cervical ripening balloon was guided through the external and internal cervical os with a ring forceps. The balloon was inflated with 60cc of normal saline. Patient tolerated the procedure well.  FHT: 130/mod/+accel/-decel  [place]: no contractions    Lab Review:  Labs in chart were reviewed.

## 2025-02-05 LAB
ERYTHROCYTE [DISTWIDTH] IN BLOOD BY AUTOMATED COUNT: 14.4 % (ref 11.5–14.5)
GP B STREP GENITAL QL CULT: NORMAL
HCT VFR BLD AUTO: 30.2 % (ref 36–46)
HGB BLD-MCNC: 10 G/DL (ref 12–16)
MCH RBC QN AUTO: 28.5 PG (ref 26–34)
MCHC RBC AUTO-ENTMCNC: 33.1 G/DL (ref 32–36)
MCV RBC AUTO: 86 FL (ref 80–100)
NRBC BLD-RTO: 0 /100 WBCS (ref 0–0)
PLATELET # BLD AUTO: 177 X10*3/UL (ref 150–450)
RBC # BLD AUTO: 3.51 X10*6/UL (ref 4–5.2)
WBC # BLD AUTO: 8.8 X10*3/UL (ref 4.4–11.3)

## 2025-02-05 PROCEDURE — 2500000001 HC RX 250 WO HCPCS SELF ADMINISTERED DRUGS (ALT 637 FOR MEDICARE OP): Performed by: STUDENT IN AN ORGANIZED HEALTH CARE EDUCATION/TRAINING PROGRAM

## 2025-02-05 PROCEDURE — 36415 COLL VENOUS BLD VENIPUNCTURE: CPT | Performed by: STUDENT IN AN ORGANIZED HEALTH CARE EDUCATION/TRAINING PROGRAM

## 2025-02-05 PROCEDURE — 85027 COMPLETE CBC AUTOMATED: CPT | Performed by: STUDENT IN AN ORGANIZED HEALTH CARE EDUCATION/TRAINING PROGRAM

## 2025-02-05 PROCEDURE — 1220000001 HC OB SEMI-PRIVATE ROOM DAILY

## 2025-02-05 RX ORDER — ENOXAPARIN SODIUM 100 MG/ML
40 INJECTION SUBCUTANEOUS EVERY 24 HOURS
Status: DISCONTINUED | OUTPATIENT
Start: 2025-02-06 | End: 2025-02-07 | Stop reason: HOSPADM

## 2025-02-05 RX ADMIN — ACETAMINOPHEN 975 MG: 325 TABLET ORAL at 04:10

## 2025-02-05 RX ADMIN — ACETAMINOPHEN 975 MG: 325 TABLET ORAL at 18:07

## 2025-02-05 RX ADMIN — ACETAMINOPHEN 975 MG: 325 TABLET ORAL at 12:00

## 2025-02-05 RX ADMIN — IBUPROFEN 600 MG: 600 TABLET, FILM COATED ORAL at 04:11

## 2025-02-05 RX ADMIN — IBUPROFEN 600 MG: 600 TABLET, FILM COATED ORAL at 18:06

## 2025-02-05 RX ADMIN — IBUPROFEN 600 MG: 600 TABLET, FILM COATED ORAL at 12:00

## 2025-02-05 ASSESSMENT — PAIN DESCRIPTION - LOCATION
LOCATION: BACK
LOCATION: ABDOMEN

## 2025-02-05 ASSESSMENT — PAIN SCALES - GENERAL
PAINLEVEL_OUTOF10: 0 - NO PAIN
PAINLEVEL_OUTOF10: 5 - MODERATE PAIN
PAINLEVEL_OUTOF10: 0 - NO PAIN
PAINLEVEL_OUTOF10: 3

## 2025-02-05 ASSESSMENT — PAIN SCALES - PAIN ASSESSMENT IN ADVANCED DEMENTIA (PAINAD): TOTALSCORE: MEDICATION (SEE MAR)

## 2025-02-05 NOTE — PROGRESS NOTES
Postpartum Progress Note    Assessment/Plan   Ena Lott is a 24 y.o., , who delivered at 35w5d gestation via Vaginal, Spontaneous after IOL in s/o severe FGR, new oligohydramnios, and gHTN.    Now PPD#1 s/p Vaginal, Spontaneous on 2025  - Continue routine postpartum care  - Pain well controlled on po medications  - DVT risk score DVT Score (IF A SCORE IS NOT CALCULATING, MUST SELECT A BMI TO COMPLETE): 5 , ppx with SCDs, ambulation, and lovenox  - RH positive, rhogam not indicated  - Hgb:   Results from last 7 days   Lab Units 25  0638 25  1553   HEMOGLOBIN g/dL 10.0* 11.3*        gHTN  - Dx by mild range BPs > 4 hrs apart  - BP normal to low mild range- no meds initiated at this time as she is < 24 hrs from delivery  - Asymptomatic  - HELLP labs negative  - BP cuff for home  - Discussed recommendation for 3 day stay, patient agreeable     Maternal Well-Being  - Vitals stable  - All questions and concerns address    [place] Feeding  - Breastfeeding/pumping encouraged  - Lactation consult prn    Contraception  - Defers contraception to primary OB/PP visit. We discussed pregnancy spacing of at least one year, abstaining from intercourse for 6wks, and the ability to become pregnant in the absence of regular menses. Pt verbalized understanding.     Dispo  - Anticipate d/c on PPD #2 if meeting all postpartum milestones  - Follow-up in 2-5 days for BP check  - Follow-up in 4-6wks with primary LEANDRO Matthew-CNP     Assessment & Plan  Vaginal delivery ([facility])    Pregnancy ([facility])    Gestational hypertension, third trimester ([facility])    Pregnancy Problems (from 24 to present)       Problem Noted Diagnosed Resolved    Labor and delivery indication for care or intervention ([facility]) 2/3/2025 by Dejan Burgos MD  No    Priority:  Medium       Pregnancy ([facility]) 2/3/2025 by Earl Richards  No    Priority:  Medium       Gestational hypertension, third trimester  ([facility]) 1/3/2025 by Shaista Chan MD  No    Priority:  Medium       Overview Signed 1/16/2025  4:40 PM by Nena Park MD     Diagnosed at 31 weeks given mild range blood pressures >4 hours apart (separate days) >20 weeks gestation in absence of evidence or diagnosis of chronic hypertension prior to pregnancy. Normal serum baseline labs and negative baseline proteinuria evaluation.    Weekly visits and labs, twice weekly fetal testing.     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, single gestation ([facility]) 1/3/2025 by Shaista Chan MD  No    Priority:  Medium       Obesity affecting pregnancy ([facility]) 1/3/2025 by Shaista Chan MD  No    Priority:  Medium               Subjective     Ena Lott is PPD#1 s/p vaginal delivery who reports feeling overall well.    Her pain is well controlled with current medications  She is passing flatus  She is ambulating well  She is tolerating a Adult diet Regular  She reports no breast or nursing problems  She denies emotional concerns today      Denies HA, SOB, RUQ pain, vision changes.     Objective   Allergies:   Patient has no known allergies.         Last Vitals:  Temp Pulse Resp BP MAP Pulse Ox   36.3 °C (97.3 °F) 70 16 130/83   100 %     Vitals Min/Max Last 24 Hours:  Temp  Min: 36 °C (96.8 °F)  Max: 37.3 °C (99.1 °F)  Pulse  Min: 60  Max: 138  Resp  Min: 16  Max: 19  BP  Min: 102/74  Max: 153/86    Intake/Output:     Intake/Output Summary (Last 24 hours) at 2/5/2025 1150  Last data filed at 2/5/2025 0400  Gross per 24 hour   Intake 500 ml   Output 825 ml   Net -325 ml       Physical Exam:  General: Examination reveals a well developed, well nourished, female, in no acute distress. She is alert and cooperative.  Lungs: symmetrical, non-labored breathing.  Cardiac: warm, well-perfused.  Abdomen: soft, non-tender.  Fundus: firm, below umbilicus, and nontender.  Extremities: no redness or tenderness  in the calves or thighs.  Neurological: alert, oriented, normal speech, no focal findings or movement disorder noted.     Lab Data:  Labs in chart were reviewed.

## 2025-02-05 NOTE — SIGNIFICANT EVENT
"To bedside for vaginal packing removal    /56   Pulse (!) 118   Temp 37 °C (98.6 °F) (Oral)   Resp 18   Ht 1.651 m (5' 5\")   Wt 108 kg (238 lb 15.7 oz)   SpO2 98%   BMI 39.77 kg/m²     Vag pack removed with additional 125mL QBL in recovery. Total QBL 625mL. No trickle of blood from vagina  BSUS: thin endometrial stripe    Okay for transfer to postpartum now  Will do AM CBC  Remove calles catheter  LR 500mL bolus    Discussed with Dr. Zahira España MD  PGY-4, Obstetrics and Gynecology    "

## 2025-02-05 NOTE — ANESTHESIA POSTPROCEDURE EVALUATION
Patient: Ena Lott    Procedure Summary       Date: 25 Room / Location:     Anesthesia Start:  Anesthesia Stop:     Procedure: Labor Analgesia Diagnosis:     Scheduled Providers:  Responsible Provider: Lucia Jackson MD    Anesthesia Type: epidural ASA Status: 3            Anesthesia Type: epidural      Anesthesia Post Evaluation    Patient location during evaluation: floor  Patient participation: complete - patient participated  Level of consciousness: awake and alert  Pain management: adequate  Airway patency: patent  Cardiovascular status: acceptable, hemodynamically stable and blood pressure returned to baseline  Respiratory status: acceptable, spontaneous ventilation and unassisted  Hydration status: acceptable  Postoperative Nausea and Vomiting: none        No notable events documented.    Ena Lott is a 24 y.o., , who had a Vaginal, Spontaneous delivery on 2025 at 35w5d and is now POD1.    She had Neuraxial Anesthesia without immediate complications noted.       Pain was appropriately tolerated by patient.     Vitals:    25 0410   BP: 117/75   Pulse: 60   Resp: 16   Temp: 36 °C (96.8 °F)   SpO2: 98%       Neuraxial site assessed. No visible redness or swelling. Pain acceptably controlled. Patient able to ambulate and move all extremities without difficulty. Able to void. No complaints of nausea/vomiting. Tolerating PO intake well. No s/sx of PDPH.     Neuraxial site without redness, drainage, swelling.  Neuromuscular block resolved.    Anesthesia will sign off       True Hsu MD   PGY2, Anesthesiology

## 2025-02-05 NOTE — LACTATION NOTE
Lactation Consultant Note  Lactation Consultation  Reason for Consult: Initial assessment  Consultant Name: Soledad Gardner RN, IBCLC    Maternal Information  Has mother  before?: No  Infant to breast within first 2 hours of birth?: No  Breastfeeding Delayed Due to: Infant status    Maternal Assessment  Breast Assessment: Medium  Nipple Assessment: Intact, Short, Erect  [place] Assessment: Normal    Infant Assessment  Infant Behavior:  (infant at NICU)    Feeding Assessment  Nutrition Source:  (per NICU)  Unable to assess infant feeding at this time: Infant unable to breastfeed to alteration in health status    LATCH TOOL       Breast Pump  Pump: Hospital grade electric pump  Frequency: Less than 3 times per day  Duration: Initiate phase  Breast Shield Size and Type: Other (comment), 21 mm (18mm flange given after consult)  Units of Volume: Drops    Other OB Lactation Tools       Patient Follow-up  Inpatient Lactation Follow-up Needed : Yes  Outpatient Lactation Follow-up: Recommended    Other OB Lactation Documentation  Maternal Risk Factors:  delivery <37 weeks, Hypertension, Preeclampsia  Infant Risk Factors: Prematurity <37 weeks, Low birth weight <2500 g, Infant Apgar <8    Recommendations/Summary  Met with mom and reviewed the pump including using heat and breast massage prior to, measured nipples and discussed flange size (15-16mm nipples, 18mm flanges given, 21 used prior to obtaining the smaller size), reviewed pump set up and how to use the initiate program, hands-on-pumping, frequency and duration of pump sessions, cleaning of pump parts, and milk storage guidelines.     Encouraged mom to do skin to skin once baby is stable enough to do so.  And encouraged mom to call for any assistance needed.     She ordered a pump for home and is waiting for it to arrive, I informed her of the gene pump program option and she will decide if she wants to do that.     Outpatient information given for follow  up after discharge.

## 2025-02-05 NOTE — CARE PLAN
The patient's goals for the shift include to visit baby in nicu    The clinical goals for the shift include pt will rate pain <4/10

## 2025-02-05 NOTE — SIGNIFICANT EVENT
Patient feeling increased pressure    SVE 10/100/1  /mod/+accel/-decel  [place]: no contractions    Will start second stage now  Cat I  Epidural infusing, additional dose from anesthesia now    Ana España MD  PGY-4, Obstetrics and Gynecology

## 2025-02-05 NOTE — CARE PLAN
The patient's goals for the shift include Visit baby at the nicu    The clinical goals for the shift include Have second void  and bleeding will be wdl    Pt voiding without difficulty. Bleeding wdl and pt meeting pp milestones as expected. Pt continues to breastpump and has been visiting her baby at the nicu. Stable PP  day 1.

## 2025-02-05 NOTE — SIGNIFICANT EVENT
Patient meets criteria for home monitoring of blood pressure post discharge.  Reason:  current gestational hypertension. Met with patient to assess for availability of home BP monitor.  Patient stated she owns home BP monitor.  Patient educated on importance of continuing to monitor BP at home, recording BP on home monitoring log and s/sx of when to call her provider.  Pt verbalized understanding the above information.

## 2025-02-05 NOTE — L&D DELIVERY NOTE
Vaginal Delivery Note    Patient Name: Ena Lott  : 2000  MRN: [id number]  Age: 24 y.o.    /Para:   Gestational Age: 35w5d    Date of Delivery: 2025    Procedure: Normal Spontaneous Vaginal Delivery    Delivery Provider: Zahira    Resident/Fellow/Other Assistant: Yvetet España    Description of Procedure:  Delivery of viable infant under epidural anesthesia. Delayed clamping was performed. The infant was placed taken to warmer.. Cord gases were not sent.  Cord blood was collected. Placenta delivered intact and fundus was firm following uterotonics    2nd degree and bilateral periurethral lacerations identified and repaired. Vaginal packing placed due to slow persistent bleeding from laceration site. Plan to remove in two hours. Bimanual massage performed. Cytotec given for lower uterine segment atony.     Complications: None    Quantitative Blood Loss:   Delivery Blood Loss   Intrapartum & Postpartum: 25 1546 - 25    Delivery Admission: 25 1357 - 25         Intrapartum & Postpartum Delivery Admission    None               Blood products:      Uterotonics/Hemostatic Agent: IV Pitocin 30 units and MI Misoprostol 800 mcg    Specimen:   Placenta  Delivered: 2025  7:44 PM  Appearance: Intact  Removal: Spontaneous    Disposition: pathology    Sponge/Instrument/Needle Counts: The sponge, lap and needle counts were correct.    Patient Disposition: Patient recovering on labor and delivery in stable condition.    Additional Procedures:  None    Jayson Lott [[id number]]      Labor Events    Rupture date/time: 2025 1230  Rupture type: Artificial  Fluid color: Clear  Fluid odor: None  Labor type: Induced Onset of Labor  Labor allowed to proceed with plans for an attempted vaginal birth?: Yes  Induction: Oxytocin, Tay/EASI  First cervical ripening date/time: 2/3/2025 2009  Induction date/time: 2/3/2025 2009  Induction indications:  Other  Complications: None       Labor Event Times    Labor onset date/time: 2/3/2025 1546  Dilation complete date/time: 2025 1923  Start pushing date/time: 2025       Placenta    Placenta delivery date/time: 2025  Placenta removal: Spontaneous  Placenta appearance: Intact  Placenta disposition: pathology       Cord    Vessels: 3 vessels  Complications: None  Delayed cord clamping?: Yes  Cord blood disposition: Lab  Gases sent?: No  Stem cell collection (by provider): No       Lacerations    Episiotomy: None  Perineal laceration: 2nd  Perineal laceration repaired?: Yes  Periurethral laceration?: Yes  Periurethral laceration location: bilateral  Periurethral laceration repaired?: Yes  Repair suture: 3-0 Monocryl       Anesthesia    Method: Epidural       Operative Delivery    Forceps attempted?: No  Vacuum extractor attempted?: No       Shoulder Dystocia    Shoulder dystocia present?: No        Delivery    Birth date/time: 2025 19:40:00  Delivery type: Vaginal, Spontaneous  Complications: None       Resuscitation    Method: Continuous positive airway pressure (CPAP), Supplemental oxygen, Suctioning, Tactile stimulation       Apgars    Living status: Living  Apgar Component Scores:  1 min.:  5 min.:  10 min.:  15 min.:  20 min.:    Skin color:         Heart rate:         Reflex irritability:         Muscle tone:         Respiratory effort:         Total:         Apgars assigned by: KINJAL OCHOA RN       Delivery Providers    Delivering clinician: Bridget Rodriges MD   Provider Role    Bety Kang, RN Delivery Nurse    Soledad Ochoa, RN Nursery Nurse    Ana España MD Resident     Resident

## 2025-02-06 ENCOUNTER — PHARMACY VISIT (OUTPATIENT)
Dept: PHARMACY | Facility: CLINIC | Age: 25
End: 2025-02-06
Payer: COMMERCIAL

## 2025-02-06 ENCOUNTER — APPOINTMENT (OUTPATIENT)
Dept: OBSTETRICS AND GYNECOLOGY | Facility: HOSPITAL | Age: 25
End: 2025-02-06
Payer: COMMERCIAL

## 2025-02-06 PROCEDURE — 1220000001 HC OB SEMI-PRIVATE ROOM DAILY

## 2025-02-06 PROCEDURE — 2500000004 HC RX 250 GENERAL PHARMACY W/ HCPCS (ALT 636 FOR OP/ED)

## 2025-02-06 PROCEDURE — 2500000001 HC RX 250 WO HCPCS SELF ADMINISTERED DRUGS (ALT 637 FOR MEDICARE OP): Performed by: STUDENT IN AN ORGANIZED HEALTH CARE EDUCATION/TRAINING PROGRAM

## 2025-02-06 PROCEDURE — RXMED WILLOW AMBULATORY MEDICATION CHARGE

## 2025-02-06 RX ORDER — ACETAMINOPHEN 325 MG/1
975 TABLET ORAL EVERY 6 HOURS
Qty: 360 TABLET | Refills: 0 | Status: SHIPPED | OUTPATIENT
Start: 2025-02-06 | End: 2025-03-08

## 2025-02-06 RX ORDER — POLYETHYLENE GLYCOL 3350 17 G/17G
17 POWDER, FOR SOLUTION ORAL DAILY
Qty: 510 G | Refills: 0 | Status: SHIPPED | OUTPATIENT
Start: 2025-02-06

## 2025-02-06 RX ORDER — IBUPROFEN 600 MG/1
600 TABLET ORAL EVERY 6 HOURS
Qty: 120 TABLET | Refills: 0 | Status: SHIPPED | OUTPATIENT
Start: 2025-02-06 | End: 2025-03-08

## 2025-02-06 RX ORDER — BREAST PUMP
1 EACH MISCELLANEOUS DAILY PRN
Qty: 1 EACH | Refills: 0 | Status: SHIPPED | OUTPATIENT
Start: 2025-02-06

## 2025-02-06 RX ADMIN — ENOXAPARIN SODIUM 40 MG: 100 INJECTION SUBCUTANEOUS at 06:00

## 2025-02-06 RX ADMIN — ACETAMINOPHEN 975 MG: 325 TABLET ORAL at 22:08

## 2025-02-06 RX ADMIN — IBUPROFEN 600 MG: 600 TABLET, FILM COATED ORAL at 22:07

## 2025-02-06 ASSESSMENT — PAIN DESCRIPTION - DESCRIPTORS: DESCRIPTORS: SORE

## 2025-02-06 ASSESSMENT — PAIN SCALES - GENERAL
PAINLEVEL_OUTOF10: 0 - NO PAIN
PAINLEVEL_OUTOF10: 0 - NO PAIN
PAINLEVEL_OUTOF10: 1

## 2025-02-06 ASSESSMENT — PAIN DESCRIPTION - LOCATION: LOCATION: BACK

## 2025-02-06 ASSESSMENT — PAIN DESCRIPTION - ORIENTATION: ORIENTATION: MID;LOWER

## 2025-02-06 NOTE — PROGRESS NOTES
Postpartum Progress Note    Assessment/Plan     Ena Lott is a 24 y.o., , who initially presented for IOL in s/o sFGR, gHTN, and newly diagnosed oligohydramnios. She had a Vaginal, Spontaneous delivery on 2025 at 35w5d and is now PPD2.      gHTN  - dx by mild range BP readings >4hrs apart  - asx  - no meds  - HELLP labs negative  - normotensive to mild range in PP period  - discussed with pt if she has consecutive or trending BP readings in mild range, will plan to start anti hypertensive, she is agreeable to this plan  - discussed 3 day stay for BP, pt verbalizes understanding   - BP cuff for home at discharge, to monitor BID      - continue routine care  - pain well controlled on ERAS protocol  - HMG 11.3 ->  > PPD#1 10.1, 2nd degree pernieal and bilateral periurethral lacerations repaired   - stellate tear > s/p vaginal packing  - DVT Score (IF A SCORE IS NOT CALCULATING, MUST SELECT A BMI TO COMPLETE): 5 SCDs and enoxaparin prophylactic dose daily while inpatient  - Pt's blood type is A POS. Rhogam is not indicated.    Contraception  declines bridge method and discussed pregnancy spacing of at least one year, abstaining from intercourse for 6wks, and the ability to become pregnant in the absence of regular menses. Pt verbalized understanding    Maternal Well-Being  - emotional support provided  - bonding with infant in NICU  -  feeding: breastfeeding/pumping encouraged; lactation consult prn     Dispo  - anticipate d/c on PPD#2-3 if meeting all post-op and postpartum milestones    - The signs and symptoms of PEC were reviewed with the patient, including unrelenting headache, vision changes/blurred vision, and RUQ pain  - BP cuff for home for checking BP twice a day  - Pt instructed to call primary OB if SBP > 160 or DBP > 110 or if development of PEC symptoms     - On discharge, follow up with primary OB in:       - 2-5 days for BP check       - 4-6 weeks for post-partum visit        Assessment & Plan  Vaginal delivery ([facility])    Gestational hypertension, third trimester ([facility])    Pregnancy ([facility])        Subjective   Her pain is well controlled with current medications  She is passing flatus  She is ambulating independently  She is tolerating a Adult diet Regular  She is voiding spontaneously  She reports no breast or nursing problems  She denies emotional concerns today     Denies CP, SOB, RUQ pain, HA, scotoma, vision changes      Objective   Last Vitals:  Temp Pulse Resp BP MAP Pulse Ox   36.3 °C (97.3 °F) 103 18 (!) 140/88   97 %       Physical Exam:  General: well appearing, well nourished, postpartum  Obstetric: fundus firm below umbilicus, lochia light  Skin: Warm, dry; no rashes/lesions/erythema  Neuro: A/Ox3, no gross motor deficit   GI: no distension, appropriately tender, soft  Respiratory: Even and unlabored on RA  Cardiovascular: Trace BLE edema; No erythema, warmth  Psych: appropriate mood and affect      Lab Data:  Lab Results   Component Value Date    WBC 8.8 02/05/2025    HGB 10.0 (L) 02/05/2025    HCT 30.2 (L) 02/05/2025     02/05/2025

## 2025-02-06 NOTE — LACTATION NOTE
This note was copied from a baby's chart.  Lactation Consultant Note  Lactation Consultation       Maternal Information       Maternal Assessment       Infant Assessment       Feeding Assessment       LATCH TOOL       Breast Pump       Other OB Lactation Tools       Patient Follow-up       Other OB Lactation Documentation       Recommendations/Summary  Mom states she spoke with [place] [facility] and had no questions for me at this time. Mom stated a pump was ordered for her. Encouraged mom to contact lactation with any questions or concerns.

## 2025-02-06 NOTE — CARE PLAN
The patient's goals for the shift include Visit baby at the nicu    The clinical goals for the shift include Have second void  and bleeding will be wdl

## 2025-02-06 NOTE — CARE PLAN
The patient's goals for the shift include visit baby at nicu    The clinical goals for the shift include pt will rate pain <4/10    Pt is doing well throughout the night and vss. Pt denies of any pain. Pt fundus is firm and bleeding is light. Pt is resting comfortably at this time.

## 2025-02-06 NOTE — LACTATION NOTE
This note was copied from a baby's chart.  Lactation Consultant Note  Lactation Consultation   Tanisha Sultana RN    Maternal Information       Maternal Assessment       Infant Assessment       Feeding Assessment       LATCH TOOL       Breast Pump       Other OB Lactation Tools       Patient Follow-up       Other OB Lactation Documentation       Recommendations/Summary  Met with mom at NICU bedside to follow up with her progress on providing breast milk. She says she does not have any concerns at this time. Invited her to reach out to lactation services with any further questions or concerns.

## 2025-02-06 NOTE — CARE PLAN
The patient's goals for the shift include visit baby at nicu    The clinical goals for the shift include pt will rate pain <4/10    Patient remained free from falls/injury throughout shift. Pain well controlled on scheduled tylenol and motrin, no s/sx of infection at this time. VSS and lochia WNL. Patient  on an appropriate schedule and demonstrating appropriate bonding with infant in NICU. Resting comfortably in bed, denies needs at this time.

## 2025-02-07 VITALS
OXYGEN SATURATION: 97 % | RESPIRATION RATE: 18 BRPM | HEIGHT: 65 IN | WEIGHT: 238.98 LBS | HEART RATE: 79 BPM | DIASTOLIC BLOOD PRESSURE: 78 MMHG | SYSTOLIC BLOOD PRESSURE: 133 MMHG | TEMPERATURE: 97.3 F | BODY MASS INDEX: 39.82 KG/M2

## 2025-02-07 PROCEDURE — 99238 HOSP IP/OBS DSCHRG MGMT 30/<: CPT

## 2025-02-07 PROCEDURE — 2500000004 HC RX 250 GENERAL PHARMACY W/ HCPCS (ALT 636 FOR OP/ED)

## 2025-02-07 PROCEDURE — 2500000005 HC RX 250 GENERAL PHARMACY W/O HCPCS

## 2025-02-07 PROCEDURE — 2500000001 HC RX 250 WO HCPCS SELF ADMINISTERED DRUGS (ALT 637 FOR MEDICARE OP)

## 2025-02-07 RX ADMIN — ENOXAPARIN SODIUM 40 MG: 100 INJECTION SUBCUTANEOUS at 08:36

## 2025-02-07 RX ADMIN — WITCH HAZEL 1 EACH: 500 SOLUTION RECTAL; TOPICAL at 00:12

## 2025-02-07 RX ADMIN — BENZOCAINE AND LEVOMENTHOL: 200; 5 SPRAY TOPICAL at 00:12

## 2025-02-07 ASSESSMENT — PAIN SCALES - GENERAL
PAINLEVEL_OUTOF10: 1
PAINLEVEL_OUTOF10: 0 - NO PAIN
PAINLEVEL_OUTOF10: 0 - NO PAIN

## 2025-02-07 ASSESSMENT — PAIN SCALES - PAIN ASSESSMENT IN ADVANCED DEMENTIA (PAINAD): TOTALSCORE: MEDICATION (SEE MAR)

## 2025-02-07 ASSESSMENT — PAIN DESCRIPTION - LOCATION: LOCATION: BACK

## 2025-02-07 NOTE — CARE PLAN
The patient's goals for the shift include visit baby in nicu    The clinical goals for the shift include continue to meet PP milestones      Problem: Pain - Adult  Goal: Verbalizes/displays adequate comfort level or baseline comfort level  Outcome: Met     Problem: Postpartum  Goal: Minimal s/sx of HDP and BP<160/110  Outcome: Met     Problem: Postpartum  Goal: Appropriate maternal -  bonding  Outcome: Met     Problem: Postpartum  Goal: No s/sx infection  Outcome: Met    VS and assessment findings stable through discharge. RN reviewed homegoing instructions with patient. PT denies OB complaints at this time. Pt denies any further questions/concerns

## 2025-02-07 NOTE — DISCHARGE SUMMARY
"Discharge Summary    Ena Lott is a 24 y.o. year old female , who delivered at 35w5d gestation via Vaginal, Spontaneous, now PPD#3.    Admission Date: 2/3/2025  Discharge Date: 25       Discharge Diagnosis  Vaginal, Spontaneous    Hospital Course  Delivery Date: 2025 7:40 PM  Delivery type: Vaginal, Spontaneous   GA at delivery: 35w5d   Outcome: Living  Intrapartum complications: None  Feeding method: Breastfeeding Status: Yes       Contraception at discharge: defers. We discussed pregnancy spacing of at least one year, abstaining from intercourse for 6wks, and the ability to become pregnant in the absence of regular menses. Pt verbalized understanding.    Pertinent Physical Exam At Time of Discharge    Physical Exam  Vitals reviewed.   Constitutional:       Appearance: Normal appearance.   HENT:      Head: Normocephalic.   Cardiovascular:      Rate and Rhythm: Normal rate and regular rhythm.      Pulses: Normal pulses.      Heart sounds: Normal heart sounds.   Pulmonary:      Effort: Pulmonary effort is normal.      Breath sounds: Normal breath sounds.   Abdominal:      General: Abdomen is flat. Bowel sounds are normal.      Palpations: Abdomen is soft.      Comments: Fundus firm, midline, below umbilicus, light lochia      Skin:     General: Skin is warm.   Neurological:      Mental Status: She is alert.   Psychiatric:         Mood and Affect: Mood normal.         Behavior: Behavior normal.          Vitals  /78 (BP Location: Right arm, Patient Position: Sitting)   Pulse 79   Temp 36.3 °C (97.3 °F) (Temporal)   Resp 18   Ht 1.651 m (5' 5\")   Wt 108 kg (238 lb 15.7 oz)   SpO2 97%   Breastfeeding Yes   BMI 39.77 kg/m²      Discharge Meds     Your medication list        START taking these medications        Instructions Last Dose Given Next Dose Due   acetaminophen 325 mg tablet  Commonly known as: Tylenol      Take 3 tablets (975 mg) by mouth every 6 hours.       breast pump " device      1 Units once daily as needed (for postpartum).       ibuprofen 600 mg tablet      Take 1 tablet (600 mg) by mouth every 6 hours.       polyethylene glycol 17 gram/dose powder  Commonly known as: Glycolax, Miralax      Mix 17 g of powder (1 capful dissolved in 8 ounces of water) and drink once daily.              CONTINUE taking these medications        Instructions Last Dose Given Next Dose Due   prenatal vit,calc76-iron-folic 29 mg iron- 1 mg tablet  Commonly known as: Prenatabs Rx                     Where to Get Your Medications        These medications were sent to [facility]  [address] [place] [id number]      Hours: 8:30 AM to 5 PM Mon-Fri Phone: [phone]   acetaminophen 325 mg tablet  ibuprofen 600 mg tablet  polyethylene glycol 17 gram/dose powder       You can get these medications from any pharmacy    Bring a paper prescription for each of these medications  breast pump device          Complications Requiring Follow-Up  gHTN  - Dx by 2 mild range pressures >4hrs apart  - Asymptomatic  - Now normotensive on no meds  - HELLP labs negative  - BP cuff for home  - Discussed s/sx of PEC and parameters of monitoring    Continues to meet all postpartum milestones.  Safe and stable for d/c home. Return precautions given.  Follow-up in 2-5 day for BP check and 4-6wk with Dr. Maddox.      Test Results Pending At Discharge  Pending Labs       Order Current Status    Surgical Pathology Exam - PLACENTA In process            Outpatient Follow-Up    I spent 20 minutes in the professional and overall care of this patient.      Yusra Tompkins, APRN-CNP

## 2025-02-07 NOTE — CARE PLAN
Problem: Vaginal Birth or  Section  Goal: Minimal s/sx of HDP and BP<160/110  2025 0643 by Sarah Gunter, RN  Outcome: Progressing  2025 by Sarah Gunter, RN  Outcome: Progressing   The patient's goals for the shift include visit baby at nicu    The clinical goals for the shift include Pt to meet PP milestone, and BP less than 160/110    Over the shift, the patient did make progress toward the following goals.

## 2025-02-14 ENCOUNTER — LACTATION ENCOUNTER (OUTPATIENT)
Dept: OTHER | Facility: HOSPITAL | Age: 25
End: 2025-02-14

## 2025-02-14 NOTE — LACTATION NOTE
This note was copied from a baby's chart.  Lactation Consultant Note  Lactation Consultation   Soledad Alvarez RN IBCLC    Recommendations/Summary       I spoke with parents at pt's bedside to discuss mom's concerns of low milk supply. Reviewed need for frequent breast emptying. Encouraged mom to pump every 2-3 hours or 8 times per 24 hours, including at least 1 pumping at night. Mom has been having difficulty with her pumping schedule.  She was invited to go no longer 4 to 5 hours between pumps at night.  A better pumping schedule for her would include: pump, go to bed, set her alarm for 4 to 5 hours and then get up and pump again.  She should then maintain a pumping schedule of every 2 to 3 hours during the day.  We discussed her drinking enough fluids and maintaining her nutrition while breast feeding.  She should continue with her vitamins as well.  Mom was invited to order inserts for her pump at home and she was provided a pair of 18 mm flanges to trial while here.  She was invited to follow up with  services as needed.

## 2025-02-17 ENCOUNTER — APPOINTMENT (OUTPATIENT)
Dept: OBSTETRICS AND GYNECOLOGY | Facility: HOSPITAL | Age: 25
End: 2025-02-17
Payer: COMMERCIAL

## 2025-02-18 ENCOUNTER — CLINICAL SUPPORT (OUTPATIENT)
Dept: OBSTETRICS AND GYNECOLOGY | Facility: HOSPITAL | Age: 25
End: 2025-02-18
Payer: COMMERCIAL

## 2025-02-18 ENCOUNTER — LACTATION ENCOUNTER (OUTPATIENT)
Dept: OTHER | Facility: HOSPITAL | Age: 25
End: 2025-02-18

## 2025-02-18 VITALS
HEART RATE: 77 BPM | OXYGEN SATURATION: 100 % | DIASTOLIC BLOOD PRESSURE: 79 MMHG | WEIGHT: 221.2 LBS | BODY MASS INDEX: 36.81 KG/M2 | SYSTOLIC BLOOD PRESSURE: 114 MMHG | RESPIRATION RATE: 18 BRPM | TEMPERATURE: 97.9 F

## 2025-02-18 PROCEDURE — 99211 OFF/OP EST MAY X REQ PHY/QHP: CPT | Performed by: STUDENT IN AN ORGANIZED HEALTH CARE EDUCATION/TRAINING PROGRAM

## 2025-02-18 ASSESSMENT — PAIN SCALES - GENERAL: PAINLEVEL_OUTOF10: 0-NO PAIN

## 2025-02-18 NOTE — PROGRESS NOTES
Patient presents today for a BP check.  She is s/p  delivery on 25  The baby is feeding well. Baby admitted to NICU  She denies any, headaches. Chest pain. Shortness of breath. Fever, or chills.  She is feeling well today.  She has not been checking blood pressures at home  Reports that lochia is: WNL  Continues taking: no meds  Mood stable  She is scheduled for a postpartum visit on: Pt scheduling visit on her way out today    Warning signs: Call your OB provider for fever of 100.4 or greater or chills; breast are hot, red, painful, or swollen; severe headaches; blurred vision; difficulty breathing; sudden pain in your arms, legs, or chest; hot or swollen areas on your legs; severe cramps or belly pain; heavy vaginal bleeding (more than your period); passing large blood clots from your vagina (larger than a golf ball or egg); an unusual, foul odor from your vaginal discharge; increased redness, pain, drainage or separation of your episiotomy stitches; pain, burning, or difficulty during urination; severe constipation;feelings of depression (such as depressed mood, loss of interest in enjoyable things, unable to care for yourself, trouble sleeping, lack of appetite, feeling worthless, or thoughts of harming yourself or someone else)     All questions and concerns were addressed, post partum warning signs reviewed closely with patient. She verbalized understanding of the above.    Radha MAURO, RN

## 2025-02-18 NOTE — LACTATION NOTE
This note was copied from a baby's chart.  Lactation Consultant Note  Lactation Consultation   Soledad Alvarez RN IBCLC  Recommendations/Summary       I spoke with mom at pt's bedside to see how pumping was going.  Mom reports that she is getting a bit more milk.  She continues to struggle with waking to pump overnight.  Mom was just encouraged to keep trying to get more pumps in per day.  The baby is working on her oral skills with the bottle.  Mom cam to the support group.  Mom was invited to follow up with LC services as needed.

## 2025-02-20 DIAGNOSIS — Z82.79 FAMILY HISTORY OF CHROMOSOMAL MICRODUPLICATION: Primary | ICD-10-CM

## 2025-02-20 NOTE — PROGRESS NOTES
"Genetics Update    Spoke with Ms. 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    It is possible that Ms. Lott's daughter inherited these duplications from Ms. Lott herself or Ms. Lott has a different combination of chromosomal duplications/deletions. Genetic testing of Ms. Lott is medically indicated because identifying these same duplications and/or other duplications/deletions in her would have an impact on the recurrence risk in future offspring and may change her own management.     Therefore we discussed chromosomal microarray testing, which looks for missing pieces (deletions) or extra pieces (duplications) of DNA.     The possible outcomes of testing include:  1. She is found to have one or both of the duplications seen in her daughter.  2. Negative - she is not found to have the duplications seen in her daughter and there are no other  significant deletions or duplications.  3. It is also possible we could detect an unexpected deletion or duplication unrelated to her daughter's chromosomal changes.   4. Variant(s) of Uncertain Significance (VUS) - deletion(s) or duplication(s) identified, but there is not enough information to determine if they are clinically significant or if they are benign.   5. Microarrays can also identify information for which we are not specifically looking, such as biologic relationships (parental consanguinity, non-paternity).  Based on the outcomes of the testing, we may recommend further genetic testing.    We reviewed that “genetic discrimination\" is one possible risk of genetic testing, especially for individuals without a prior medical diagnosis. This is the " possibility that insurance companies or employers could use genetic information to increase premiums, discontinue coverage, or affect employability. CARLO, which stands for the Genetic Information Nondiscrimination Act, is legislation that prohibits health insurance companies from requiring or using genetic information to determine eligibility or establish premiums. It also protects against employment discrimination based on genetic information with the exception of the [place] , the Federal Employees Health Benefits program, and employers with fewer than 15 employees. This protection does NOT apply to life insurance, disability insurance, or long-term care insurance, meaning these companies can ask for genetic information when an individual takes out a new/additional policy in one of those areas.     Ms. Lott understanding and elected to proceed with SNP microarray testing. We encouraged her to call her insurance company to determine the expected out-of-pocket expense for this test.  She voiced understanding.    Plan  - microarray,  ordered    Sammie Whitt DO  Pediatrics/Genetics, PGY-4  [facility]    I personally saw, spoke with the patient with the resident, and discussed the patient with the resident.     Tahmina Perez MD  Medical Geneticist

## 2025-02-24 LAB
LABORATORY COMMENT REPORT: NORMAL
PATH REPORT.FINAL DX SPEC: NORMAL
PATH REPORT.GROSS SPEC: NORMAL
PATH REPORT.RELEVANT HX SPEC: NORMAL
PATH REPORT.TOTAL CANCER: NORMAL

## 2025-03-13 ENCOUNTER — LACTATION ENCOUNTER (OUTPATIENT)
Dept: OTHER | Facility: HOSPITAL | Age: 25
End: 2025-03-13

## 2025-03-13 NOTE — LACTATION NOTE
This note was copied from a baby's chart.  Lactation Consultant Note  Lactation Consultation   Tanisha Sultana RN, IBCLC    Recommendations/Summary  Met with mom at NICU bedside to follow up with her progress on providing breast milk.  She says she is still pumping but sometimes pumps every few hours and sometimes skips up to a day. Asked about supplements or foods to increase supply. Reinforced with her that first and foremost maintaining a regular pumping schedule of 8 times per day is most important for maintaining a milk supply and that the volume would become less and less if she extended intervals over 4 hours. Invited her to reach out to lactation services with any further questions or concerns.

## 2025-03-14 ENCOUNTER — TELEPHONE (OUTPATIENT)
Dept: OBSTETRICS AND GYNECOLOGY | Facility: HOSPITAL | Age: 25
End: 2025-03-14

## 2025-03-18 ENCOUNTER — LACTATION ENCOUNTER (OUTPATIENT)
Dept: OTHER | Facility: HOSPITAL | Age: 25
End: 2025-03-18

## 2025-03-18 ENCOUNTER — POSTPARTUM VISIT (OUTPATIENT)
Dept: OBSTETRICS AND GYNECOLOGY | Facility: HOSPITAL | Age: 25
End: 2025-03-18
Payer: COMMERCIAL

## 2025-03-18 VITALS
BODY MASS INDEX: 38.49 KG/M2 | HEIGHT: 65 IN | DIASTOLIC BLOOD PRESSURE: 79 MMHG | WEIGHT: 231 LBS | SYSTOLIC BLOOD PRESSURE: 119 MMHG

## 2025-03-18 DIAGNOSIS — Z12.4 SCREENING FOR CERVICAL CANCER: ICD-10-CM

## 2025-03-18 PROCEDURE — 88175 CYTOPATH C/V AUTO FLUID REDO: CPT | Mod: TC,GCY | Performed by: ADVANCED PRACTICE MIDWIFE

## 2025-03-18 PROCEDURE — 99213 OFFICE O/P EST LOW 20 MIN: CPT | Performed by: ADVANCED PRACTICE MIDWIFE

## 2025-03-18 SDOH — ECONOMIC STABILITY: FOOD INSECURITY: WITHIN THE PAST 12 MONTHS, THE FOOD YOU BOUGHT JUST DIDN'T LAST AND YOU DIDN'T HAVE MONEY TO GET MORE.: NEVER TRUE

## 2025-03-18 SDOH — ECONOMIC STABILITY: FOOD INSECURITY: WITHIN THE PAST 12 MONTHS, YOU WORRIED THAT YOUR FOOD WOULD RUN OUT BEFORE YOU GOT MONEY TO BUY MORE.: NEVER TRUE

## 2025-03-18 ASSESSMENT — EDINBURGH POSTNATAL DEPRESSION SCALE (EPDS)
I HAVE BLAMED MYSELF UNNECESSARILY WHEN THINGS WENT WRONG: YES, MOST OF THE TIME
I HAVE BEEN SO UNHAPPY THAT I HAVE BEEN CRYING: YES, QUITE OFTEN
I HAVE FELT SCARED OR PANICKY FOR NO GOOD REASON: NO, NOT MUCH
I HAVE BEEN ANXIOUS OR WORRIED FOR NO GOOD REASON: YES, SOMETIMES
THINGS HAVE BEEN GETTING ON TOP OF ME: YES, SOMETIMES I HAVEN'T BEEN COPING AS WELL AS USUAL
I HAVE BEEN ABLE TO LAUGH AND SEE THE FUNNY SIDE OF THINGS: AS MUCH AS I ALWAYS COULD
TOTAL SCORE: 13
I HAVE FELT SAD OR MISERABLE: NOT VERY OFTEN
I HAVE BEEN SO UNHAPPY THAT I HAVE HAD DIFFICULTY SLEEPING: NOT VERY OFTEN
THE THOUGHT OF HARMING MYSELF HAS OCCURRED TO ME: NEVER
I HAVE LOOKED FORWARD WITH ENJOYMENT TO THINGS: RATHER LESS THAN I USED TO

## 2025-03-18 ASSESSMENT — PATIENT HEALTH QUESTIONNAIRE - PHQ9
10. IF YOU CHECKED OFF ANY PROBLEMS, HOW DIFFICULT HAVE THESE PROBLEMS MADE IT FOR YOU TO DO YOUR WORK, TAKE CARE OF THINGS AT HOME, OR GET ALONG WITH OTHER PEOPLE: NOT DIFFICULT AT ALL
SUM OF ALL RESPONSES TO PHQ9 QUESTIONS 1 & 2: 2
1. LITTLE INTEREST OR PLEASURE IN DOING THINGS: SEVERAL DAYS
2. FEELING DOWN, DEPRESSED OR HOPELESS: SEVERAL DAYS

## 2025-03-18 ASSESSMENT — PAIN SCALES - GENERAL: PAINLEVEL_OUTOF10: 0-NO PAIN

## 2025-03-18 NOTE — PROGRESS NOTES
Subjective   24 y.o.  presenting for postpartum follow-up     Delivery Date: 2025  Gestational Age: 35w5d   Type of Delivery: Vaginal, Spontaneous     Pregnancy Problems (from 24 to present)       Problem Noted Diagnosed Resolved    Vaginal delivery ([facility]) 2/3/2025 by Dejan Burgos MD  No    Priority:  Medium       Pregnancy ([facility]) 2/3/2025 by Earl Richards  No    Priority:  Medium       Gestational hypertension, third trimester ([facility]) 1/3/2025 by Shaista Chan MD  No    Priority:  Medium       Overview Signed 2025  4:40 PM by Nena Park MD     Diagnosed at 31 weeks given mild range blood pressures >4 hours apart (separate days) >20 weeks gestation in absence of evidence or diagnosis of chronic hypertension prior to pregnancy. Normal serum baseline labs and negative baseline proteinuria evaluation.    Weekly visits and labs, twice weekly fetal testing.     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, single gestation ([facility]) 1/3/2025 by Shaista Chan MD  No    Priority:  Medium       Obesity affecting pregnancy ([facility]) 1/3/2025 by Shaista Chan MD  No    Priority:  Medium       Overview Signed 3/13/2025  3:39 PM by Ashley Hassan, APRN-CNM, APRN-CNP     BMI = Could not be calculated at B  Fetal surveillance BMI 35-39.9 at [facility]:  Growth US at 30 and 36 wks  BPP or NST weekly 37 wks to delivery    `Timing of delivery: 39 0/7 - 39 6/7 wks  *BMI >= 50 at any time in pregnancy: Deliver at Level 4               Concerns: breast milk supply, mucous discharge    Pain: controlled  Lacerations: 2nd degree, no issues  Lochia: alba  Sexual Intimacy: No  Contraceptive Method:  undecided, condoms  Feeding Method:  pumping and baby getting fortified feels in ICN   low supply  Lactation Consult Needed?: Yes    Birth Trauma: No, more of a traumatic ICN experience  Bonding with Baby: well with baby girlDinora    Mood:  "  Postpartum Depression: High Risk (3/18/2025)    [place]  Depression Scale     Last EPDS Total Score: 13     Last EPDS Self Harm Result: Never     Last pap: unknown    Objective    /79   Ht 1.651 m (5' 5\")   Wt 105 kg (231 lb)   Breastfeeding Yes   BMI 38.44 kg/m²    Physical Exam  Constitutional:       General: She is not in acute distress.     Appearance: Normal appearance. She is well-developed.   Genitourinary:      Urethral meatus normal.      No lesions in the vagina.      Genitourinary Comments: 2nd degree perineal laceration well healed      Right Labia: No rash, lesions or skin changes.     Left Labia: No lesions, skin changes or rash.     No vaginal discharge, erythema or bleeding.      No vaginal prolapse present.     Mild vaginal atrophy present.       Right Adnexa: not tender and no mass present.     Left Adnexa: not tender and no mass present.     Cervix is parous.      Cervical polyp present.      No cervical motion tenderness, discharge, friability or lesion.      Uterus is not fixed, tender or irregular.      No uterine mass detected.     Uterus is midaxial.      Pelvic exam was performed with patient in the lithotomy position.   Breasts:     Right: Normal.      Left: Normal.   Neck:      Thyroid: No thyroid mass, thyromegaly or thyroid tenderness.   Pulmonary:      Effort: Pulmonary effort is normal.   Abdominal:      Palpations: Abdomen is soft. There is no mass.      Tenderness: There is no abdominal tenderness.   Neurological:      General: No focal deficit present.   Skin:     General: Skin is warm and dry.   Psychiatric:         Mood and Affect: Mood and affect normal.         Behavior: Behavior is cooperative.   Vitals reviewed.       Plan    Diagnoses and all orders for this visit:  Routine postpartum follow-up  - routine PP exam  - pap collected  - condoms for contraception  - reviewed hypoestrogenic effects of lactation, continue PNV  - Advised to call office for " breast complaints, abnormal bleeding, mood changes, or other concerning symptoms.   - Cleared to resume normal activity as desired  - RTO 1 year for AE or prn    Ashley Hassan, APRN-CNM, APRN-CNP

## 2025-03-18 NOTE — LACTATION NOTE
This note was copied from a baby's chart.  Lactation Consultant Note  Lactation Consultation   Tanisha Sultana RN, IBCLC    Recommendations/Summary  Contacted by mom's nurse midwife to check on her needs to obtain a better breast pump. She used insurance to cover a hands free medela pump and is not feeling like it is working adequately for her. She is getting drops of milk when she hand expresses but nothing when using the hands free pump. She was sized as a 16-17 mm nipple and was using the 18mm flange with the hospital symphony but her smallest for the hands free is 21mm. I offered her information on the gene rental pump and she is going to think about whether or not she wants to go ahead with that. I also encouraged her to contact the [employer] customer service about replacing her pump since it is not working for her and is very new, and also to inquire about getting smaller flanges that are compatible with her own pump. In the meantime I explained that she should increase her pumping frequency from 4 times per day to 8 times per day as well as instructed on breast massage and hand expression before and/or after pumping. Invited to reach out to  when she is in to discuss further.

## 2025-03-31 LAB
CYTOLOGY CMNT CVX/VAG CYTO-IMP: NORMAL
LAB AP HPV GENOTYPE QUESTION: NO
LAB AP HPV HR: NORMAL
LABORATORY COMMENT REPORT: NORMAL
PATH REPORT.TOTAL CANCER: NORMAL

## 2025-04-09 ENCOUNTER — LACTATION ENCOUNTER (OUTPATIENT)
Dept: PEDIATRICS | Facility: HOSPITAL | Age: 25
End: 2025-04-09

## 2025-04-09 NOTE — LACTATION NOTE
This note was copied from a baby's chart.  Lactation Consultant Note     Recommendations/Summary  Telephone call to Mom. Mom has stopped pumping. Thanked for her efforts to provide expressed breastmilk for her child.

## 2025-04-09 NOTE — LACTATION NOTE
This note was copied from a baby's chart.  Lactation Consultant Note   Recommendations/Summary  Telephone call to Mom. Left message re: availability of LC services. Invited to contact LC services as needed.